# Patient Record
Sex: FEMALE | Race: WHITE | Employment: UNEMPLOYED | URBAN - NONMETROPOLITAN AREA
[De-identification: names, ages, dates, MRNs, and addresses within clinical notes are randomized per-mention and may not be internally consistent; named-entity substitution may affect disease eponyms.]

---

## 2017-12-18 ENCOUNTER — OFFICE VISIT (OUTPATIENT)
Dept: NEUROLOGY | Age: 27
End: 2017-12-18
Payer: MEDICAID

## 2017-12-18 VITALS
HEIGHT: 65 IN | SYSTOLIC BLOOD PRESSURE: 120 MMHG | HEART RATE: 89 BPM | BODY MASS INDEX: 38.15 KG/M2 | WEIGHT: 229 LBS | OXYGEN SATURATION: 97 % | DIASTOLIC BLOOD PRESSURE: 83 MMHG

## 2017-12-18 DIAGNOSIS — R06.83 SNORING: ICD-10-CM

## 2017-12-18 DIAGNOSIS — G47.33 OBSTRUCTIVE SLEEP APNEA: Primary | ICD-10-CM

## 2017-12-18 DIAGNOSIS — R40.0 SOMNOLENCE, DAYTIME: ICD-10-CM

## 2017-12-18 PROCEDURE — 99203 OFFICE O/P NEW LOW 30 MIN: CPT | Performed by: PHYSICIAN ASSISTANT

## 2017-12-18 RX ORDER — FLUOXETINE 10 MG/1
10 CAPSULE ORAL DAILY
COMMUNITY

## 2017-12-18 NOTE — PROGRESS NOTES
facility-administered medications for this visit. Allergies:  Review of patient's allergies indicates no known allergies. SOCIAL HISTORY:   History   Alcohol Use No     History   Drug Use No     History   Smoking Status    Current Every Day Smoker    Packs/day: 0.50   Smokeless Tobacco    Never Used       FAMILY HISTORY:   History reviewed. No pertinent family history.     REVIEW OF SYSTEMS     Constitutional: []Fever []Sweats []Chills [] Recent Injury   [x] Denies all unless marked  HENT:[]Headache  [] Head Injury  [] Sore Throat  [] Ear Pain  [] Dizziness [] Hearing Loss   [x] Denies all unless marked  Spine:  [] Neck pain  [] Back pain  [] Sciaticia  [x] Denies all unless marked  Cardiovascular:[]Chest Pain []Palpitations [] Heart Disease  [x] Denies all unless marked  Pulmonary: []Shortness of Breath []Cough   [x] Denies all unless marked  Gastrointestinal:  []Abdominal Pain  []Blood in Stool  []Diarrhea []Constipation []Nausea  []Vomiting  [x] Denies all unless marked  Genitourinary:  [] Dysuria [] Frequency  [] Incontinence [] Urgency   [x] Denies all unless marked  Musculoskeletal: [] Arthralgia  [] Myalgias [] Muscle cramps  [] Muscle twitches   [x] Denies all unless marked   Extremities:   [] Pain   [] Swelling   [x] Denies all unless marked  Skin:[] Rash  [] Color Change  [x] Denies all unless marked  Neurological:[] Visual Disturbance [] Double Vision [] Slurred Speech [] Trouble swallowing  [] Vertigo [] Tingling [] Numbness [] Weakness [] Loss of Balance   [] Loss of Consciousness [] Memory Loss  [x] Denies all unless marked  Psychiatric/Behavioral:[] Depression [] Anxiety  [x] Denies all unless marked  Sleep: []  Insomnia [x] Sleep Disturbance [x] Snoring [] Restless Legs [x] Daytime Sleepiness [] Sleep Apnea  [] Denies all unless marked    PHYSICAL EXAMINATION:  Vitals: /83   Pulse 89   Ht 5' 5\" (1.651 m)   Wt 229 lb (103.9 kg)   LMP 12/16/2017   SpO2 97%   BMI 38.11 kg/m²

## 2017-12-18 NOTE — PATIENT INSTRUCTIONS
apnea, see your doctor. Is there a test for sleep apnea?  Yes. If your doctor or nurse suspects you have sleep apnea, he or she might send you for a \"sleep study. \" Sleep studies can sometimes be done at home, but they are usually done in a sleep lab. For the study, you spend the night in the lab, and you are hooked up to different machines that monitor your heart rate, breathing, and other body functions. The results of the test will tell your doctor or nurse if you have the disorder. Is there anything I can do on my own to help my sleep apnea?  Yes. Here are some things that might help:  ? Stay off your back when sleeping. (This is not always practical, because people cannot control their position while asleep. Plus, it only helps some people.)  ? Lose weight, if you are overweight  ? Avoid alcohol, because it can make sleep apnea worse    How is sleep apnea treated?  As mentioned above, weight loss can help if you are overweight or obese. But losing weight can be challenging, and it takes time to lose enough weight to help with your sleep apnea. Most people need other treatment while they work on losing weight. The most effective treatment for sleep apnea is a device that keeps your airway open while you sleep. Treatment with this device is called \"continuous positive airway pressure,\" or CPAP. People getting CPAP wear a face mask at night that keeps them breathing. If your doctor or nurse recommends a CPAP machine, try to be patient about using it. The mask might seem uncomfortable to wear at first, and the machine might seem noisy, but using the machine can really pay off. People with sleep apnea who use a CPAP machine feel more rested and generally feel better. There is also another device that you wear in your mouth called an \"oral appliance\" or \"mandibular advancement device. \" It also helps keep your airway open while you sleep. This device is only recommended for mild cases of sleep apnea.  It may not be covered by your insurance. In rare cases, when nothing else helps, doctors recommend surgery to keep the airway open. Surgery to do this is not always effective, and even when it is, the problem can come back. Is sleep apnea dangerous?  It can be. People with sleep apnea do not get good-quality sleep, so they are often tired and not alert. This puts them at risk for car accidents and other types of accidents. Plus, studies show that people with sleep apnea are more likely than others to have high blood pressure, heart attacks, and other serious heart problems. In people with severe sleep apnea, getting treated (for example, with a CPAP machine) can help prevent some of these problems. Important information:  Medicare/private insurance CPAP/BiPAP/APAP requirements:  Medicare/private insurance has specific requirements for PAP compliance that must be met during the first 90 days of use to continue coverage for CPAP/BiPAP/APAP  from day 91 and beyond. The policy requires that patients use a PAP device 4 hours per 24 hour period, at least 70% of the time over a 30 day period. This data must be downloaded as a report direct from the PAP devices. This is called a compliance download. Your PAP supplier will assist you in this matter. Reminder:  Please bring a copy of the compliance download to your next office visit or have your supplier fax it to our office prior to your office visit. Note:  Where applicable, we will utilize PAP device efficiency reports, additional testing, and face-to-face  clinical evaluation subsequent to any treatment, changes in treatment, and continued treatment. Caution:  Please abstain from driving or engaging in other activities which may be hazardous in the presence of diminished alertness or daytime drowsiness. And avoid the use of sedatives or alcohol, which can worsen sleep apnea and daytime drowsiness.      Mask suggestions:  - Resmed Airfit N20 (Nasal) or F20 (Full face mask). They conform to your face, thus decreasing the potential for mask leakage. You might like the [employer] (full face mask). It has a \"memory foam\" like cushion. You might also like to try a nasal mask called a Dreamwear nasal mask or the Dreamwear nasal pillow. One other suggestion is the [facility], it is a minimal contact full face mask. The Ruth Kiss incredible under the nose design makes it the only full face mask that won't cause red marks on the bridge of your nose when compared to other Full Face Masks        Organizations  American Sleep Apnea Association  Provides information about sleep apnea to the public, publishes a newsletter, and serves as an advocate for people with the disorder. Anthony, [address]   Terry@Microtask. org   [email or web address].[email or web address]. org   Tel: [phone]   Fax: [facility] that works to [employer] and safety by promoting public understanding of sleep and sleep disorders. Supports sleep-related education, research, and advocacy; produces and distributes educational materials to the public and healthcare professionals; and offers postdoctoral fellowships and grants for sleep researchers. [address]   Ronel@Microtask. org   SurferLive.[email or web address]. org   Tel: [phone]   Fax: [phone]       Sleep Studies: About This Test  What is it? Sleep studies are tests that watch what happens to your body during sleep. These studies usually are done in a sleep lab. Sleep labs are often located in hospitals. But sleep studies also can be done with portable equipment that you use at home. Why is this test done? Sleep studies are done to find sleep problems, including:  · Sleep apnea or excessive snoring. · Narcolepsy. · Nocturnal seizures. · REM behavior disorder (RBD).   · Repeated muscle home. This may be a choice for people who have problems sleeping in a sleep lab. But home sleep studies may not give the same results as a sleep lab. How long does the test take? · You will stay in the sleep lab overnight. For some tests, you will also stay part of the next day. What happens after the test?  · You will be able to go home right away. · You can go back to your usual activities right away. Follow-up care is a key part of your treatment and safety. Be sure to make and go to all appointments, and call your doctor if you are having problems. It's also a good idea to keep a list of the medicines you take. Ask your doctor when you can expect to have your test results. Where can you learn more? Go to https://Eclector.SmartHub. org and sign in to your [facility] account. Enter S492 in the [place] box to learn more about \"Sleep Studies: About This Test.\"     If you do not have an account, please click on the \"Sign Up Now\" link. Current as of: May 12, 2017  Content Version: 11.4  © 6475-1034 [employer]. Care instructions adapted under license by [facility] ([facility]). If you have questions about a medical condition or this instruction, always ask your healthcare professional. [address] any warranty or liability for your use of this information. Learning About CPAP for Sleep Apnea  What is CPAP? CPAP is a small machine that you use at home every night while you sleep. It increases air pressure in your throat to keep your airway open. When you have sleep apnea, this can help you sleep better so you feel much better. CPAP stands for \"continuous positive airway pressure. \"  The CPAP machine will have one of the following:  · A mask that covers your nose and mouth  · Prongs that fit into your nose  · A mask that covers your nose only, the most common type. This type is called NCPAP. The N stands for \"nasal.\"  Why is it done?   CPAP is usually the Health. If you have questions about a medical condition or this instruction, always ask your healthcare professional. [place] [address] any warranty or liability for your use of this information.

## 2018-01-31 DIAGNOSIS — G47.33 OBSTRUCTIVE SLEEP APNEA: Primary | ICD-10-CM

## 2018-01-31 DIAGNOSIS — R06.83 SNORING: ICD-10-CM

## 2018-01-31 DIAGNOSIS — R40.0 DAYTIME SOMNOLENCE: ICD-10-CM

## 2018-02-14 ENCOUNTER — HOSPITAL ENCOUNTER (OUTPATIENT)
Dept: SLEEP CENTER | Age: 28
Discharge: HOME | End: 2018-02-16
Payer: MEDICAID

## 2018-02-14 PROCEDURE — G0399 HOME SLEEP TEST/TYPE 3 PORTA: HCPCS

## 2018-02-14 PROCEDURE — 95806 SLEEP STUDY UNATT&RESP EFFT: CPT | Performed by: PSYCHIATRY & NEUROLOGY

## 2018-02-26 DIAGNOSIS — G47.33 OBSTRUCTIVE SLEEP APNEA: Primary | ICD-10-CM

## 2018-05-02 ENCOUNTER — TELEPHONE (OUTPATIENT)
Dept: NEUROLOGY | Age: 28
End: 2018-05-02

## 2019-11-04 LAB
EXTERNAL ANTIBODY SCREEN: NEGATIVE
EXTERNAL HEPATITIS B SURFACE ANTIGEN: NEGATIVE
EXTERNAL HEPATITIS C AB: NEGATIVE
EXTERNAL HERPES CULTURE: NORMAL
EXTERNAL RUBELLA QUALITATIVE: NORMAL
EXTERNAL SYPHILIS RPR SCREEN: NORMAL
HIV1 P24 AG SERPL QL IA: NORMAL

## 2020-04-28 ENCOUNTER — HOSPITAL ENCOUNTER (OUTPATIENT)
Facility: HOSPITAL | Age: 30
Discharge: HOME | End: 2020-04-28
Attending: OBSTETRICS & GYNECOLOGY | Admitting: OBSTETRICS & GYNECOLOGY

## 2020-04-28 VITALS
HEART RATE: 90 BPM | SYSTOLIC BLOOD PRESSURE: 118 MMHG | DIASTOLIC BLOOD PRESSURE: 56 MMHG | RESPIRATION RATE: 18 BRPM | TEMPERATURE: 98.2 F | OXYGEN SATURATION: 96 %

## 2020-04-28 PROBLEM — Z34.90 PREGNANCY: Status: ACTIVE | Noted: 2020-04-28

## 2020-04-28 PROCEDURE — 59025 FETAL NON-STRESS TEST: CPT

## 2020-04-28 PROCEDURE — G0463 HOSPITAL OUTPT CLINIC VISIT: HCPCS

## 2020-04-28 PROCEDURE — G0378 HOSPITAL OBSERVATION PER HR: HCPCS

## 2020-05-07 ENCOUNTER — NURSE TRIAGE (OUTPATIENT)
Dept: CALL CENTER | Facility: HOSPITAL | Age: 30
End: 2020-05-07

## 2020-05-08 NOTE — TELEPHONE ENCOUNTER
"Reviewed guideline with caller, transferred call to LDR for advice.       Reason for Disposition  • [1] MILD abdominal pain (e.g., doesn't interfere with normal activities) AND [2] constant AND [3] present > 2 hours    Additional Information  • Negative: Passed out (i.e., lost consciousness, collapsed and was not responding)  • Negative: Shock suspected (e.g., cold/pale/clammy skin, too weak to stand, low BP, rapid pulse)  • Negative: Difficult to awaken or acting confused (e.g., disoriented, slurred speech)  • Negative: [1] SEVERE abdominal pain (e.g., excruciating) AND [2] constant AND [3] present > 1 hour  • Negative: SEVERE vaginal bleeding (e.g., continuous red blood from vagina, or large blood clots)  • Negative: Sounds like a life-threatening emergency to the triager  • Negative: Followed an abdomen (stomach) injury  • Negative: [1] Having contractions or other symptoms of labor (such as vaginal pressure) AND [2] >= 37 weeks pregnant (i.e., term pregnancy)  • Negative: [1] Having contractions or other symptoms of labor (such as vaginal pressure) AND [2] < 37 weeks pregnant (i.e., )  • Negative: [1] Abdominal pain AND [2] pregnant < 20 weeks  • Negative: [1] Vomiting AND [2] contains red blood or black (\"coffee ground\") material  (Exception: few red streaks in vomit that only happened once)  • Negative: MODERATE-SEVERE abdominal pain (e.g., interferes with normal activities, awakens from sleep)  • Negative: Vaginal bleeding or spotting  • Negative: [1] Baby moving less today (e.g., kick count < 5 in 1 hour or < 10 in 2 hours) AND [2] pregnant 23 or more weeks  • Negative: Leakage of fluid from vagina  • Negative: New hand or face swelling  • Negative: New blurred vision or vision changes  • Negative: [1] SEVERE headache AND [2] not relieved with acetaminophen (e.g., Tylenol)    Answer Assessment - Initial Assessment Questions  1. LOCATION: \"Where does it hurt?\"       Lower abdomen and back, tightening " "feeling in abdomen  2. RADIATION: \"Does the pain shoot anywhere else?\" (e.g., chest, back)      back  3. ONSET: \"When did the pain begin?\" (Minutes, hours or days ago)       2 hours ago  4. ONSET: \"Gradual or sudden onset?\"      Sudden onset  5. PATTERN: \"Does the pain come and go, or has it been constant since it started?\"       Comes and goes   6. SEVERITY: \"How bad is the pain?\" \"What does it keep you from doing?\"  (e.g., Scale 1-10; mild, moderate, or severe)    - MILD (1-3): doesn't interfere with normal activities, abdomen soft and not tender to touch     - MODERATE (4-7): interferes with normal activities or awakens from sleep, tender to touch     - SEVERE (8-10): excruciating pain, doubled over, unable to do any normal activities      8/10  7. RECURRENT SYMPTOM: \"Have you ever had this type of abdominal pain before?\" If so, ask: \"When was the last time?\" and \"What happened that time?\"       no  8. CAUSE: \"What do you think is causing the abdominal pain?      Possible labor pains   9. RELIEVING/AGGRAVATING FACTORS: \"What makes it better or worse?\" (e.g., antacids, bowel movement, movement)      no  10. OTHER SYMPTOMS: \"Has there been any vaginal bleeding, fever, vomiting, diarrhea, or urine problems?\"        no  11. JHONNY: \"What date are you expecting to deliver?\"        06/16/20    Protocols used: PREGNANCY - ABDOMINAL PAIN GREATER THAN 20 WEEKS EGA-ADULT-AH      "

## 2020-05-12 LAB — EXTERNAL GROUP B STREP ANTIGEN: NORMAL

## 2020-06-03 ENCOUNTER — TRANSCRIBE ORDERS (OUTPATIENT)
Dept: ADMINISTRATIVE | Facility: HOSPITAL | Age: 30
End: 2020-06-03

## 2020-06-03 DIAGNOSIS — Z01.818 PREOP TESTING: Primary | ICD-10-CM

## 2020-06-08 ENCOUNTER — LAB (OUTPATIENT)
Dept: LAB | Facility: HOSPITAL | Age: 30
End: 2020-06-08

## 2020-06-08 DIAGNOSIS — Z01.818 PRE-OP TESTING: Primary | ICD-10-CM

## 2020-06-08 PROCEDURE — U0003 INFECTIOUS AGENT DETECTION BY NUCLEIC ACID (DNA OR RNA); SEVERE ACUTE RESPIRATORY SYNDROME CORONAVIRUS 2 (SARS-COV-2) (CORONAVIRUS DISEASE [COVID-19]), AMPLIFIED PROBE TECHNIQUE, MAKING USE OF HIGH THROUGHPUT TECHNOLOGIES AS DESCRIBED BY CMS-2020-01-R: HCPCS

## 2020-06-08 PROCEDURE — C9803 HOPD COVID-19 SPEC COLLECT: HCPCS

## 2020-06-09 ENCOUNTER — PREP FOR SURGERY (OUTPATIENT)
Dept: OTHER | Facility: HOSPITAL | Age: 30
End: 2020-06-09

## 2020-06-09 LAB
COVID LABCORP PRIORITY: NORMAL
SARS-COV-2 RNA RESP QL NAA+PROBE: NOT DETECTED

## 2020-06-09 RX ORDER — SODIUM CHLORIDE 0.9 % (FLUSH) 0.9 %
3 SYRINGE (ML) INJECTION EVERY 12 HOURS SCHEDULED
Status: CANCELLED | OUTPATIENT
Start: 2020-06-10

## 2020-06-09 RX ORDER — CEFAZOLIN SODIUM IN 0.9 % NACL 3 G/100 ML
3 INTRAVENOUS SOLUTION, PIGGYBACK (ML) INTRAVENOUS ONCE
Status: CANCELLED | OUTPATIENT
Start: 2020-06-09 | End: 2020-06-09

## 2020-06-09 RX ORDER — SODIUM CHLORIDE, SODIUM LACTATE, POTASSIUM CHLORIDE, CALCIUM CHLORIDE 600; 310; 30; 20 MG/100ML; MG/100ML; MG/100ML; MG/100ML
125 INJECTION, SOLUTION INTRAVENOUS CONTINUOUS
Status: CANCELLED | OUTPATIENT
Start: 2020-06-10

## 2020-06-09 RX ORDER — SODIUM CHLORIDE 0.9 % (FLUSH) 0.9 %
3-10 SYRINGE (ML) INJECTION AS NEEDED
Status: CANCELLED | OUTPATIENT
Start: 2020-06-10

## 2020-06-10 ENCOUNTER — ANESTHESIA (OUTPATIENT)
Dept: LABOR AND DELIVERY | Facility: HOSPITAL | Age: 30
End: 2020-06-10

## 2020-06-10 ENCOUNTER — ANESTHESIA EVENT (OUTPATIENT)
Dept: LABOR AND DELIVERY | Facility: HOSPITAL | Age: 30
End: 2020-06-10

## 2020-06-10 ENCOUNTER — HOSPITAL ENCOUNTER (INPATIENT)
Facility: HOSPITAL | Age: 30
LOS: 2 days | Discharge: HOME | End: 2020-06-12
Attending: OBSTETRICS & GYNECOLOGY | Admitting: OBSTETRICS & GYNECOLOGY

## 2020-06-10 PROBLEM — Z34.90 PREGNANCY: Status: RESOLVED | Noted: 2020-04-28 | Resolved: 2020-06-10

## 2020-06-10 LAB
ABO GROUP BLD: NORMAL
BLD GP AB SCN SERPL QL: NEGATIVE
DEPRECATED RDW RBC AUTO: 46.4 FL (ref 37–54)
ERYTHROCYTE [DISTWIDTH] IN BLOOD BY AUTOMATED COUNT: 12.8 % (ref 12.3–15.4)
HCT VFR BLD AUTO: 34.1 % (ref 34–46.6)
HGB BLD-MCNC: 11.8 G/DL (ref 12–15.9)
LYMPHOCYTES # BLD MANUAL: 1.61 10*3/MM3 (ref 0.7–3.1)
LYMPHOCYTES NFR BLD MANUAL: 18 % (ref 19.6–45.3)
LYMPHOCYTES NFR BLD MANUAL: 6 % (ref 5–12)
MCH RBC QN AUTO: 34.5 PG (ref 26.6–33)
MCHC RBC AUTO-ENTMCNC: 34.6 G/DL (ref 31.5–35.7)
MCV RBC AUTO: 99.7 FL (ref 79–97)
MONOCYTES # BLD AUTO: 0.54 10*3/MM3 (ref 0.1–0.9)
NEUTROPHILS # BLD AUTO: 6.71 10*3/MM3 (ref 1.7–7)
NEUTROPHILS NFR BLD MANUAL: 74 % (ref 42.7–76)
NEUTS BAND NFR BLD MANUAL: 1 % (ref 0–5)
PLATELET # BLD AUTO: 269 10*3/MM3 (ref 140–450)
PMV BLD AUTO: 10.2 FL (ref 6–12)
RBC # BLD AUTO: 3.42 10*6/MM3 (ref 3.77–5.28)
RBC MORPH BLD: NORMAL
RH BLD: POSITIVE
SMALL PLATELETS BLD QL SMEAR: ADEQUATE
T&S EXPIRATION DATE: NORMAL
VARIANT LYMPHS NFR BLD MANUAL: 1 % (ref 0–5)
WBC MORPH BLD: NORMAL
WBC NRBC COR # BLD: 8.95 10*3/MM3 (ref 3.4–10.8)

## 2020-06-10 PROCEDURE — 25010000002 ONDANSETRON PER 1 MG: Performed by: OBSTETRICS & GYNECOLOGY

## 2020-06-10 PROCEDURE — 25010000002 DEXAMETHASONE PER 1 MG: Performed by: NURSE ANESTHETIST, CERTIFIED REGISTERED

## 2020-06-10 PROCEDURE — 25010000002 KETOROLAC TROMETHAMINE PER 15 MG: Performed by: NURSE ANESTHETIST, CERTIFIED REGISTERED

## 2020-06-10 PROCEDURE — 94799 UNLISTED PULMONARY SVC/PX: CPT

## 2020-06-10 PROCEDURE — 86850 RBC ANTIBODY SCREEN: CPT | Performed by: OBSTETRICS & GYNECOLOGY

## 2020-06-10 PROCEDURE — 25010000002 KETOROLAC TROMETHAMINE PER 15 MG: Performed by: OBSTETRICS & GYNECOLOGY

## 2020-06-10 PROCEDURE — 86901 BLOOD TYPING SEROLOGIC RH(D): CPT | Performed by: OBSTETRICS & GYNECOLOGY

## 2020-06-10 PROCEDURE — 25010000002 HYDROMORPHONE 1 MG/ML SOLUTION: Performed by: NURSE ANESTHETIST, CERTIFIED REGISTERED

## 2020-06-10 PROCEDURE — C1765 ADHESION BARRIER: HCPCS | Performed by: OBSTETRICS & GYNECOLOGY

## 2020-06-10 PROCEDURE — 25010000002 NALBUPHINE PER 10 MG: Performed by: NURSE ANESTHETIST, CERTIFIED REGISTERED

## 2020-06-10 PROCEDURE — 25010000002 CEFEPIME PER 500 MG: Performed by: OBSTETRICS & GYNECOLOGY

## 2020-06-10 PROCEDURE — 85027 COMPLETE CBC AUTOMATED: CPT | Performed by: OBSTETRICS & GYNECOLOGY

## 2020-06-10 PROCEDURE — 88307 TISSUE EXAM BY PATHOLOGIST: CPT | Performed by: OBSTETRICS & GYNECOLOGY

## 2020-06-10 PROCEDURE — 86900 BLOOD TYPING SEROLOGIC ABO: CPT | Performed by: OBSTETRICS & GYNECOLOGY

## 2020-06-10 PROCEDURE — 25010000002 ONDANSETRON PER 1 MG: Performed by: NURSE ANESTHETIST, CERTIFIED REGISTERED

## 2020-06-10 PROCEDURE — 51702 INSERT TEMP BLADDER CATH: CPT

## 2020-06-10 DEVICE — DEV CONTRL TISS STRATAFIX SPIRAL PGPCL 2/0FS 30X30CM: Type: IMPLANTABLE DEVICE | Site: ABDOMEN | Status: FUNCTIONAL

## 2020-06-10 RX ORDER — SODIUM CHLORIDE 0.9 % (FLUSH) 0.9 %
3 SYRINGE (ML) INJECTION EVERY 12 HOURS SCHEDULED
Status: DISCONTINUED | OUTPATIENT
Start: 2020-06-10 | End: 2020-06-10 | Stop reason: HOSPADM

## 2020-06-10 RX ORDER — DOCUSATE SODIUM 100 MG/1
100 CAPSULE, LIQUID FILLED ORAL 2 TIMES DAILY PRN
Status: DISCONTINUED | OUTPATIENT
Start: 2020-06-10 | End: 2020-06-12 | Stop reason: HOSPADM

## 2020-06-10 RX ORDER — ONDANSETRON 4 MG/1
4 TABLET, FILM COATED ORAL EVERY 6 HOURS PRN
Status: DISCONTINUED | OUTPATIENT
Start: 2020-06-10 | End: 2020-06-12 | Stop reason: HOSPADM

## 2020-06-10 RX ORDER — ONDANSETRON 2 MG/ML
INJECTION INTRAMUSCULAR; INTRAVENOUS AS NEEDED
Status: DISCONTINUED | OUTPATIENT
Start: 2020-06-10 | End: 2020-06-10 | Stop reason: SURG

## 2020-06-10 RX ORDER — PHENYLEPHRINE HCL IN 0.9% NACL 0.8MG/10ML
SYRINGE (ML) INTRAVENOUS AS NEEDED
Status: DISCONTINUED | OUTPATIENT
Start: 2020-06-10 | End: 2020-06-10 | Stop reason: SURG

## 2020-06-10 RX ORDER — DEXAMETHASONE SODIUM PHOSPHATE 4 MG/ML
INJECTION, SOLUTION INTRA-ARTICULAR; INTRALESIONAL; INTRAMUSCULAR; INTRAVENOUS; SOFT TISSUE AS NEEDED
Status: DISCONTINUED | OUTPATIENT
Start: 2020-06-10 | End: 2020-06-10 | Stop reason: SURG

## 2020-06-10 RX ORDER — OXYCODONE AND ACETAMINOPHEN 7.5; 325 MG/1; MG/1
1 TABLET ORAL EVERY 4 HOURS PRN
Status: ACTIVE | OUTPATIENT
Start: 2020-06-10 | End: 2020-06-11

## 2020-06-10 RX ORDER — ONDANSETRON 2 MG/ML
4 INJECTION INTRAMUSCULAR; INTRAVENOUS EVERY 6 HOURS PRN
Status: DISCONTINUED | OUTPATIENT
Start: 2020-06-10 | End: 2020-06-10 | Stop reason: SDUPTHER

## 2020-06-10 RX ORDER — METHYLERGONOVINE MALEATE 0.2 MG/ML
200 INJECTION INTRAVENOUS
Status: ACTIVE | OUTPATIENT
Start: 2020-06-10 | End: 2020-06-11

## 2020-06-10 RX ORDER — NALOXONE HCL 0.4 MG/ML
0.4 VIAL (ML) INJECTION
Status: ACTIVE | OUTPATIENT
Start: 2020-06-10 | End: 2020-06-11

## 2020-06-10 RX ORDER — IBUPROFEN 800 MG/1
800 TABLET ORAL EVERY 8 HOURS PRN
Status: DISCONTINUED | OUTPATIENT
Start: 2020-06-11 | End: 2020-06-12 | Stop reason: HOSPADM

## 2020-06-10 RX ORDER — BUPIVACAINE HYDROCHLORIDE 7.5 MG/ML
INJECTION, SOLUTION EPIDURAL; RETROBULBAR AS NEEDED
Status: DISCONTINUED | OUTPATIENT
Start: 2020-06-10 | End: 2020-06-10 | Stop reason: SURG

## 2020-06-10 RX ORDER — BISACODYL 5 MG/1
10 TABLET, DELAYED RELEASE ORAL DAILY PRN
Status: DISCONTINUED | OUTPATIENT
Start: 2020-06-10 | End: 2020-06-12 | Stop reason: HOSPADM

## 2020-06-10 RX ORDER — SODIUM CHLORIDE 0.9 % (FLUSH) 0.9 %
3-10 SYRINGE (ML) INJECTION AS NEEDED
Status: DISCONTINUED | OUTPATIENT
Start: 2020-06-10 | End: 2020-06-12 | Stop reason: HOSPADM

## 2020-06-10 RX ORDER — SODIUM CHLORIDE 0.9 % (FLUSH) 0.9 %
3 SYRINGE (ML) INJECTION EVERY 12 HOURS SCHEDULED
Status: DISCONTINUED | OUTPATIENT
Start: 2020-06-10 | End: 2020-06-12 | Stop reason: HOSPADM

## 2020-06-10 RX ORDER — FAMOTIDINE 10 MG/ML
20 INJECTION, SOLUTION INTRAVENOUS ONCE AS NEEDED
Status: COMPLETED | OUTPATIENT
Start: 2020-06-10 | End: 2020-06-10

## 2020-06-10 RX ORDER — CALCIUM CARBONATE 200(500)MG
1 TABLET,CHEWABLE ORAL 2 TIMES DAILY PRN
Status: DISCONTINUED | OUTPATIENT
Start: 2020-06-10 | End: 2020-06-12 | Stop reason: HOSPADM

## 2020-06-10 RX ORDER — CEFAZOLIN SODIUM IN 0.9 % NACL 3 G/100 ML
3 INTRAVENOUS SOLUTION, PIGGYBACK (ML) INTRAVENOUS ONCE
Status: COMPLETED | OUTPATIENT
Start: 2020-06-10 | End: 2020-06-10

## 2020-06-10 RX ORDER — SODIUM CHLORIDE, SODIUM LACTATE, POTASSIUM CHLORIDE, CALCIUM CHLORIDE 600; 310; 30; 20 MG/100ML; MG/100ML; MG/100ML; MG/100ML
125 INJECTION, SOLUTION INTRAVENOUS CONTINUOUS
Status: DISCONTINUED | OUTPATIENT
Start: 2020-06-10 | End: 2020-06-12 | Stop reason: HOSPADM

## 2020-06-10 RX ORDER — OXYCODONE AND ACETAMINOPHEN 10; 325 MG/1; MG/1
1 TABLET ORAL EVERY 6 HOURS PRN
Status: DISCONTINUED | OUTPATIENT
Start: 2020-06-11 | End: 2020-06-12 | Stop reason: HOSPADM

## 2020-06-10 RX ORDER — SODIUM CHLORIDE 0.9 % (FLUSH) 0.9 %
3-10 SYRINGE (ML) INJECTION AS NEEDED
Status: DISCONTINUED | OUTPATIENT
Start: 2020-06-10 | End: 2020-06-10 | Stop reason: HOSPADM

## 2020-06-10 RX ORDER — MISOPROSTOL 200 UG/1
800 TABLET ORAL ONCE AS NEEDED
Status: ACTIVE | OUTPATIENT
Start: 2020-06-10 | End: 2020-06-11

## 2020-06-10 RX ORDER — PROMETHAZINE HYDROCHLORIDE 25 MG/ML
12.5 INJECTION, SOLUTION INTRAMUSCULAR; INTRAVENOUS EVERY 6 HOURS PRN
Status: DISCONTINUED | OUTPATIENT
Start: 2020-06-10 | End: 2020-06-12 | Stop reason: HOSPADM

## 2020-06-10 RX ORDER — ONDANSETRON 2 MG/ML
4 INJECTION INTRAMUSCULAR; INTRAVENOUS EVERY 6 HOURS PRN
Status: DISCONTINUED | OUTPATIENT
Start: 2020-06-10 | End: 2020-06-12 | Stop reason: HOSPADM

## 2020-06-10 RX ORDER — OXYTOCIN/0.9 % SODIUM CHLORIDE 30/500 ML
250 PLASTIC BAG, INJECTION (ML) INTRAVENOUS CONTINUOUS
Status: DISPENSED | OUTPATIENT
Start: 2020-06-10 | End: 2020-06-10

## 2020-06-10 RX ORDER — CETIRIZINE HYDROCHLORIDE 10 MG/1
10 TABLET ORAL DAILY
COMMUNITY
End: 2020-06-12 | Stop reason: HOSPADM

## 2020-06-10 RX ORDER — ONDANSETRON 4 MG/1
4 TABLET, FILM COATED ORAL EVERY 8 HOURS PRN
COMMUNITY

## 2020-06-10 RX ORDER — TRISODIUM CITRATE DIHYDRATE AND CITRIC ACID MONOHYDRATE 500; 334 MG/5ML; MG/5ML
30 SOLUTION ORAL ONCE
Status: COMPLETED | OUTPATIENT
Start: 2020-06-10 | End: 2020-06-10

## 2020-06-10 RX ORDER — KETOROLAC TROMETHAMINE 30 MG/ML
30 INJECTION, SOLUTION INTRAMUSCULAR; INTRAVENOUS EVERY 6 HOURS
Status: COMPLETED | OUTPATIENT
Start: 2020-06-10 | End: 2020-06-11

## 2020-06-10 RX ORDER — OXYCODONE HYDROCHLORIDE AND ACETAMINOPHEN 5; 325 MG/1; MG/1
1 TABLET ORAL EVERY 4 HOURS PRN
Status: DISCONTINUED | OUTPATIENT
Start: 2020-06-11 | End: 2020-06-12 | Stop reason: HOSPADM

## 2020-06-10 RX ORDER — OXYTOCIN/0.9 % SODIUM CHLORIDE 30/500 ML
125 PLASTIC BAG, INJECTION (ML) INTRAVENOUS CONTINUOUS PRN
Status: COMPLETED | OUTPATIENT
Start: 2020-06-10 | End: 2020-06-10

## 2020-06-10 RX ORDER — OXYCODONE AND ACETAMINOPHEN 7.5; 325 MG/1; MG/1
2 TABLET ORAL EVERY 4 HOURS PRN
Status: ACTIVE | OUTPATIENT
Start: 2020-06-10 | End: 2020-06-11

## 2020-06-10 RX ORDER — KETOROLAC TROMETHAMINE 30 MG/ML
INJECTION, SOLUTION INTRAMUSCULAR; INTRAVENOUS AS NEEDED
Status: DISCONTINUED | OUTPATIENT
Start: 2020-06-10 | End: 2020-06-10 | Stop reason: SURG

## 2020-06-10 RX ORDER — SODIUM CHLORIDE, SODIUM LACTATE, POTASSIUM CHLORIDE, CALCIUM CHLORIDE 600; 310; 30; 20 MG/100ML; MG/100ML; MG/100ML; MG/100ML
125 INJECTION, SOLUTION INTRAVENOUS CONTINUOUS
Status: DISCONTINUED | OUTPATIENT
Start: 2020-06-10 | End: 2020-06-10 | Stop reason: HOSPADM

## 2020-06-10 RX ORDER — VALACYCLOVIR HYDROCHLORIDE 500 MG/1
500 TABLET, FILM COATED ORAL DAILY
Status: ON HOLD | COMMUNITY
End: 2020-06-10

## 2020-06-10 RX ORDER — CARBOPROST TROMETHAMINE 250 UG/ML
250 INJECTION, SOLUTION INTRAMUSCULAR
Status: ACTIVE | OUTPATIENT
Start: 2020-06-10 | End: 2020-06-11

## 2020-06-10 RX ORDER — PROMETHAZINE HYDROCHLORIDE 25 MG/1
25 TABLET ORAL EVERY 6 HOURS PRN
Status: DISCONTINUED | OUTPATIENT
Start: 2020-06-10 | End: 2020-06-12 | Stop reason: HOSPADM

## 2020-06-10 RX ORDER — OXYTOCIN/0.9 % SODIUM CHLORIDE 30/500 ML
999 PLASTIC BAG, INJECTION (ML) INTRAVENOUS ONCE
Status: DISCONTINUED | OUTPATIENT
Start: 2020-06-10 | End: 2020-06-12 | Stop reason: HOSPADM

## 2020-06-10 RX ORDER — BUTORPHANOL TARTRATE 1 MG/ML
0.5 INJECTION, SOLUTION INTRAMUSCULAR; INTRAVENOUS EVERY 6 HOURS PRN
Status: ACTIVE | OUTPATIENT
Start: 2020-06-10 | End: 2020-06-11

## 2020-06-10 RX ORDER — NALBUPHINE HCL 10 MG/ML
2.5 AMPUL (ML) INJECTION EVERY 4 HOURS PRN
Status: DISPENSED | OUTPATIENT
Start: 2020-06-10 | End: 2020-06-11

## 2020-06-10 RX ORDER — PRENATAL VIT/IRON FUM/FOLIC AC 27MG-0.8MG
1 TABLET ORAL DAILY
Status: DISCONTINUED | OUTPATIENT
Start: 2020-06-11 | End: 2020-06-12 | Stop reason: HOSPADM

## 2020-06-10 RX ORDER — PROMETHAZINE HYDROCHLORIDE 12.5 MG/1
12.5 SUPPOSITORY RECTAL EVERY 6 HOURS PRN
Status: DISCONTINUED | OUTPATIENT
Start: 2020-06-10 | End: 2020-06-12 | Stop reason: HOSPADM

## 2020-06-10 RX ADMIN — SODIUM CITRATE AND CITRIC ACID MONOHYDRATE 30 ML: 500; 334 SOLUTION ORAL at 09:42

## 2020-06-10 RX ADMIN — BUPIVACAINE HYDROCHLORIDE 1.7 ML: 7.5 INJECTION, SOLUTION EPIDURAL; RETROBULBAR at 11:35

## 2020-06-10 RX ADMIN — SODIUM CHLORIDE, POTASSIUM CHLORIDE, SODIUM LACTATE AND CALCIUM CHLORIDE 125 ML/HR: 600; 310; 30; 20 INJECTION, SOLUTION INTRAVENOUS at 20:36

## 2020-06-10 RX ADMIN — SODIUM CHLORIDE, POTASSIUM CHLORIDE, SODIUM LACTATE AND CALCIUM CHLORIDE: 600; 310; 30; 20 INJECTION, SOLUTION INTRAVENOUS at 12:14

## 2020-06-10 RX ADMIN — CEFEPIME HYDROCHLORIDE 2 G: 2 INJECTION, POWDER, FOR SOLUTION INTRAVENOUS at 16:20

## 2020-06-10 RX ADMIN — ONDANSETRON HYDROCHLORIDE 4 MG: 2 SOLUTION INTRAMUSCULAR; INTRAVENOUS at 23:05

## 2020-06-10 RX ADMIN — SODIUM CHLORIDE, POTASSIUM CHLORIDE, SODIUM LACTATE AND CALCIUM CHLORIDE 125 ML/HR: 600; 310; 30; 20 INJECTION, SOLUTION INTRAVENOUS at 09:53

## 2020-06-10 RX ADMIN — HYDROMORPHONE HYDROCHLORIDE 100 MCG: 1 INJECTION, SOLUTION INTRAMUSCULAR; INTRAVENOUS; SUBCUTANEOUS at 11:35

## 2020-06-10 RX ADMIN — SODIUM CHLORIDE, POTASSIUM CHLORIDE, SODIUM LACTATE AND CALCIUM CHLORIDE 1000 ML: 600; 310; 30; 20 INJECTION, SOLUTION INTRAVENOUS at 07:45

## 2020-06-10 RX ADMIN — NALBUPHINE HYDROCHLORIDE 2.5 MG: 10 INJECTION, SOLUTION INTRAMUSCULAR; INTRAVENOUS; SUBCUTANEOUS at 13:51

## 2020-06-10 RX ADMIN — KETOROLAC TROMETHAMINE 30 MG: 30 INJECTION, SOLUTION INTRAMUSCULAR; INTRAVENOUS at 19:00

## 2020-06-10 RX ADMIN — DEXAMETHASONE SODIUM PHOSPHATE 8 MG: 4 INJECTION, SOLUTION INTRAMUSCULAR; INTRAVENOUS at 12:01

## 2020-06-10 RX ADMIN — ONDANSETRON HYDROCHLORIDE 4 MG: 2 SOLUTION INTRAMUSCULAR; INTRAVENOUS at 11:45

## 2020-06-10 RX ADMIN — OXYTOCIN 0.7 UNITS/MIN: 10 INJECTION, SOLUTION INTRAMUSCULAR; INTRAVENOUS at 11:58

## 2020-06-10 RX ADMIN — METRONIDAZOLE 500 MG: 500 INJECTION, SOLUTION INTRAVENOUS at 13:00

## 2020-06-10 RX ADMIN — OXYTOCIN-SODIUM CHLORIDE 0.9% IV SOLN 30 UNIT/500ML 125 ML/HR: 30-0.9/5 SOLUTION at 15:00

## 2020-06-10 RX ADMIN — FAMOTIDINE 20 MG: 10 INJECTION, SOLUTION INTRAVENOUS at 09:42

## 2020-06-10 RX ADMIN — Medication 80 MCG: at 12:01

## 2020-06-10 RX ADMIN — CEFAZOLIN 3 G: 1 INJECTION, POWDER, FOR SOLUTION INTRAMUSCULAR; INTRAVENOUS; PARENTERAL at 11:37

## 2020-06-10 RX ADMIN — METRONIDAZOLE 500 MG: 500 INJECTION, SOLUTION INTRAVENOUS at 19:49

## 2020-06-10 RX ADMIN — HYDROMORPHONE HYDROCHLORIDE 900 MCG: 1 INJECTION, SOLUTION INTRAMUSCULAR; INTRAVENOUS; SUBCUTANEOUS at 12:01

## 2020-06-10 RX ADMIN — KETOROLAC TROMETHAMINE 30 MG: 30 INJECTION, SOLUTION INTRAMUSCULAR at 12:23

## 2020-06-10 NOTE — PLAN OF CARE
Problem: Patient Care Overview  Goal: Plan of Care Review  Outcome: Ongoing (interventions implemented as appropriate)  Flowsheets (Taken 6/10/2020 4898)  Progress: improving  Plan of Care Reviewed With: patient; spouse  Outcome Summary: Viable female delivered via csection. Low transverse incision with prieno dressing in place. FF.u2,lochia scant. Pt denies pain at this time, received 2.5 mg of nubain for itching. Pitocin 2nd bag infusing.

## 2020-06-10 NOTE — PLAN OF CARE
Problem: Patient Care Overview  Goal: Plan of Care Review  Outcome: Ongoing (interventions implemented as appropriate)  Flowsheets (Taken 6/10/2020 1700)  Progress: improving  Plan of Care Reviewed With: patient; spouse  Outcome Summary: VSS: FF/ML/U2 with scant lochia, incision clean dry and intact with no symptoms of infection, output borderline adequate, nothing for pain given yet, does not want the TDAP vaccine, encouraged cough and deep breathing and incentive hourly while awake, O2 runs around 95%, antiboitcs and toradol as scheulded.     Problem: Patient Care Overview  Goal: Individualization and Mutuality  Outcome: Ongoing (interventions implemented as appropriate)  Flowsheets  Taken 6/10/2020 1700 by Mg Mckenzie, RN  Patient Specific Goals (Include Timeframe): pain at tolerable level and home with healthy infant  Patient Specific Interventions: give pain medication and monitor effectivness, assist with going to see infant in NICU as needed  What Anxieties, Fears, Concerns, or Questions Do You Have About Your Care?: current status of infant in the NICU  Patient Specific Preferences: formula feeding; wants to go to NICU to see infant  Taken 6/10/2020 1426 by Christina Jon, RN  How to Address Anxieties/Fears: Educate mom on schedules and visiting times in NICU for infant involement

## 2020-06-10 NOTE — ANESTHESIA PROCEDURE NOTES
Spinal Block      Patient reassessed immediately prior to procedure    Patient location during procedure: OB  Indication:procedure for pain  Performed By  CRNA: Lori Godoy CRNA  Preanesthetic Checklist  Completed: patient identified, site marked, surgical consent, pre-op evaluation, timeout performed, IV checked, risks and benefits discussed and monitors and equipment checked  Spinal Block Prep:  Patient Position:sitting  Sterile Tech:cap, gloves and sterile barriers  Patient Monitoring:EKG, continuous pulse oximetry and blood pressure monitoring  Spinal Block Procedure  Approach:midline  Guidance:landmark technique  Location:L4-L5  Needle Type:Liz  Needle Gauge:24 G  Placement of Spinal needle event:cerebrospinal fluid aspirated  Paresthesia: left and transient  Fluid Appearance:clear     Post Assessment  Patient Tolerance:patient tolerated the procedure well with no apparent complications  Complications no

## 2020-06-10 NOTE — ANESTHESIA PREPROCEDURE EVALUATION
Anesthesia Evaluation     NPO Solid Status: > 8 hours  NPO Liquid Status: > 8 hours           Airway   Mallampati: II  TM distance: >3 FB  Neck ROM: full  No difficulty expected  Dental      Pulmonary - negative pulmonary ROS   Cardiovascular - negative cardio ROS        Neuro/Psych- negative ROS  GI/Hepatic/Renal/Endo    (+) obesity,       Musculoskeletal     Abdominal    Substance History - negative use     OB/GYN    (+) Pregnant,         Other - negative ROS                Lab Results   Component Value Date    WBC 8.95 06/10/2020    HGB 11.8 (L) 06/10/2020    HCT 34.1 06/10/2020    MCV 99.7 (H) 06/10/2020     06/10/2020     Lab Results   Component Value Date    GLUCOSE 86 05/19/2016    CALCIUM 9.2 05/19/2016     05/19/2016    K 3.9 05/19/2016    CO2 24 05/19/2016     05/19/2016    BUN 9 05/19/2016    CREATININE 0.61 05/19/2016    ANIONGAP 10 05/19/2016              Anesthesia Plan    ASA 2     spinal       Anesthetic plan, all risks, benefits, and alternatives have been provided, discussed and informed consent has been obtained with: patient.    Plan discussed with CRNA.

## 2020-06-10 NOTE — LACTATION NOTE
Mother desires to formula feed. Non-nursing mother's packet given and reviewed with patient and SO. Discussed signs of milk, signs/symptoms/treatment of engorgement and mastitis. Encouragement and support provided.

## 2020-06-10 NOTE — OP NOTE
Section Procedure Note    Indications: malpresentation: nettie breech    Pre-operative Diagnosis: malpresentation: nettie breech    Post-operative Diagnosis: malpresentation: nettie breech    Planned Procedure:  Primary     Surgeon: Pauly Wright MD     Assistants: None    Anesthesia: Spinal anesthesia    ASA Class: 2      Procedure Details     The risks, benefits, indications and alternatives of the procedure were reviewed with the patient and informed consent was obtained. The patient was taken to the Operating Room with an IV running. She was placed in the dorsal supine position, with a leftward tilt, then prepped and draped in the usual sterile fashion. Spinal anesthesia was then administrated without difficulty. A Pfannenstiel skin incision was made approximately 2cm above the symphysis pubis and extended down sharply to the rectus fascia. The fascia was then nicked in the midline and extended laterally. The muscles of the anterior abdominal wall were . The fascia was then grasped and the fascial incision extended laterally via superior and inferior traction. The peritoneum was next identified, and entered bluntly with the digits. The peritoneal incision was also extended via traction, taking care to note the position of the bladder. Next a bladder blade was inserted. A vesicouterine incision was made with the Metzembaum scissors, and a bladder flap gently created. The bladder blade was then reinserted. Next, the uterine incision was made with the scalpel, in transverse, elliptical fashion. The uterine incision was extended laterally via traction. The bladder blade was then removed, and the fetal vertex delivered through the uterine incision without difficulty. Nares and mouth were suctioned on the sterile field. The remainder of the infant was then delivered, and infant passed to the awaiting NICU team. Next, the placenta was delivered manually, taking care to remove all  membranes. The uterus was then exteriorized, and cleared of all clot and debris. The uterine incision was closed with 0-Vicryl in running, locking fashion. Excellent hemostasis was noted. Copious irrigation of the posterior cul-de-sac was performed with warm, sterile saline. The uterus was returned to the abdominal cavity, and the gutters cleared of all clot and debris. Interceed was placed over the uterine incision and over the body of the uterus to prevent future adhesions. The fascia was then closed with 0-Vicryl in running fashion. Next Interceed was layered over the fascia to prevent future adhesions. The skin was subsequently closed with Stratafix suture in 2 layers. The skin was then dressed with a Prineo gauze.    Findings:  VFI  In breech, OA position, clear,no nuchal cord. Manual extraction of  Normal placenta and cord. Apgars         APGARS  One minute Five minutes Ten minutes Fifteen minutes Twenty minutes   Skin color: 0   1             Heart rate: 2   2             Grimace: 2   2              Muscle tone: 2   2              Breathin   2              Totals: 8   9              , Weight: 3240 g (7 lb 2.3 oz)  Length: 18.5  in. NICU/Nursery Present.    Estimated Blood Loss:  600ml           Drains: 200ml           Total IV Fluids: 1200ml           Specimens: Placenta, cord blood           Implants: Interceed x 2           Complications:  None; patient tolerated the procedure well.           Disposition: PACU - hemodynamically stable.           Condition: stable    Attending Attestation: I performed the procedure.    Pauly Wright MD  2020  05:01

## 2020-06-11 LAB
BASOPHILS # BLD AUTO: 0.04 10*3/MM3 (ref 0–0.2)
BASOPHILS NFR BLD AUTO: 0.3 % (ref 0–1.5)
DEPRECATED RDW RBC AUTO: 47.7 FL (ref 37–54)
EOSINOPHIL # BLD AUTO: 0.09 10*3/MM3 (ref 0–0.4)
EOSINOPHIL NFR BLD AUTO: 0.8 % (ref 0.3–6.2)
ERYTHROCYTE [DISTWIDTH] IN BLOOD BY AUTOMATED COUNT: 12.7 % (ref 12.3–15.4)
HCT VFR BLD AUTO: 32.7 % (ref 34–46.6)
HGB BLD-MCNC: 11 G/DL (ref 12–15.9)
IMM GRANULOCYTES # BLD AUTO: 0.05 10*3/MM3 (ref 0–0.05)
IMM GRANULOCYTES NFR BLD AUTO: 0.4 % (ref 0–0.5)
LYMPHOCYTES # BLD AUTO: 2.2 10*3/MM3 (ref 0.7–3.1)
LYMPHOCYTES NFR BLD AUTO: 18.8 % (ref 19.6–45.3)
MCH RBC QN AUTO: 34.5 PG (ref 26.6–33)
MCHC RBC AUTO-ENTMCNC: 33.6 G/DL (ref 31.5–35.7)
MCV RBC AUTO: 102.5 FL (ref 79–97)
MONOCYTES # BLD AUTO: 0.69 10*3/MM3 (ref 0.1–0.9)
MONOCYTES NFR BLD AUTO: 5.9 % (ref 5–12)
NEUTROPHILS # BLD AUTO: 8.66 10*3/MM3 (ref 1.7–7)
NEUTROPHILS NFR BLD AUTO: 73.8 % (ref 42.7–76)
NRBC BLD AUTO-RTO: 0 /100 WBC (ref 0–0.2)
PLATELET # BLD AUTO: 262 10*3/MM3 (ref 140–450)
PMV BLD AUTO: 10 FL (ref 6–12)
RBC # BLD AUTO: 3.19 10*6/MM3 (ref 3.77–5.28)
WBC NRBC COR # BLD: 11.73 10*3/MM3 (ref 3.4–10.8)

## 2020-06-11 PROCEDURE — 25010000002 KETOROLAC TROMETHAMINE PER 15 MG: Performed by: OBSTETRICS & GYNECOLOGY

## 2020-06-11 PROCEDURE — 25010000002 CEFEPIME PER 500 MG: Performed by: OBSTETRICS & GYNECOLOGY

## 2020-06-11 PROCEDURE — 85025 COMPLETE CBC W/AUTO DIFF WBC: CPT | Performed by: OBSTETRICS & GYNECOLOGY

## 2020-06-11 RX ADMIN — KETOROLAC TROMETHAMINE 30 MG: 30 INJECTION, SOLUTION INTRAMUSCULAR; INTRAVENOUS at 13:01

## 2020-06-11 RX ADMIN — PRENATAL VIT W/ FE FUMARATE-FA TAB 27-0.8 MG 1 TABLET: 27-0.8 TAB at 08:29

## 2020-06-11 RX ADMIN — CEFEPIME HYDROCHLORIDE 2 G: 2 INJECTION, POWDER, FOR SOLUTION INTRAVENOUS at 08:29

## 2020-06-11 RX ADMIN — KETOROLAC TROMETHAMINE 30 MG: 30 INJECTION, SOLUTION INTRAMUSCULAR; INTRAVENOUS at 00:04

## 2020-06-11 RX ADMIN — METRONIDAZOLE 500 MG: 500 INJECTION, SOLUTION INTRAVENOUS at 06:57

## 2020-06-11 RX ADMIN — CEFEPIME HYDROCHLORIDE 2 G: 2 INJECTION, POWDER, FOR SOLUTION INTRAVENOUS at 15:39

## 2020-06-11 RX ADMIN — KETOROLAC TROMETHAMINE 30 MG: 30 INJECTION, SOLUTION INTRAMUSCULAR; INTRAVENOUS at 06:46

## 2020-06-11 RX ADMIN — CEFEPIME HYDROCHLORIDE 2 G: 2 INJECTION, POWDER, FOR SOLUTION INTRAVENOUS at 00:04

## 2020-06-11 RX ADMIN — METRONIDAZOLE 500 MG: 500 INJECTION, SOLUTION INTRAVENOUS at 01:54

## 2020-06-11 RX ADMIN — IBUPROFEN 800 MG: 800 TABLET ORAL at 20:13

## 2020-06-11 NOTE — ANESTHESIA POSTPROCEDURE EVALUATION
Patient: Ruby Chan    Procedure Summary     Date:  06/10/20 Room / Location:  [facility] LABOR DELIVERY   [facility] LABOR DELIVERY    Anesthesia Start:  112 Anesthesia Stop:  123    Procedure:  PRIMARY  SECTION  WITHOUT TUBAL LIGATION (N/A Abdomen) Diagnosis:  (MALPRESENTATION - BREECH)    Surgeon:  Pauly Wright MD Provider:  Lori Godoy CRNA    Anesthesia Type:  spinal ASA Status:  2          Anesthesia Type: spinal    Vitals  Vitals Value Taken Time   /61 2020 11:00 AM   Temp 97.2 °F (36.2 °C) 2020  7:45 AM   Pulse 83 2020 11:00 AM   Resp 18 2020 11:00 AM   SpO2 98 % 2020 11:00 AM           Post Anesthesia Care and Evaluation    Anesthetic complications: No anesthetic complications  Post Neuraxial Block status: Motor and sensory function returned to baseline and No signs or symptoms of PDPH

## 2020-06-11 NOTE — PLAN OF CARE
Problem: Patient Care Overview  Goal: Plan of Care Review  Outcome: Ongoing (interventions implemented as appropriate)  Flowsheets (Taken 2020 1500)  Progress: improving  Plan of Care Reviewed With: patient; spouse  Outcome Summary: VSS. FF ML U2 scant lochia. ALT CDI with binder off. Voiding and ambulating in hallway frequently. Toradol given for pain. Bonding well with

## 2020-06-11 NOTE — PROGRESS NOTES
"[facility]   Section Postpartum Delivery Progress Note    Subjective   Postpartum Day 1:  Delivery    The patient feels well.  Her pain is well controlled with prescribed pain medications.   She is ambulating well.  Patient describes her bleeding as moderate lochia.    Breastfeeding: declines.    Objective     Vital Signs Range for the last 24 hours  Temperature: Temp:  [97 °F (36.1 °C)-97.9 °F (36.6 °C)] 97.9 °F (36.6 °C)   Temp Source: Temp src: Oral   BP: BP: ()/(47-87) 103/67   Pulse: Heart Rate:  [67-96] 76   Respirations: Resp:  [18-20] 18   SPO2: SpO2:  [91 %-96 %] 96 %   O2 Amount(L/min):     O2 Devices     Weight: Weight:  [120 kg (264 lb)] 120 kg (264 lb)     Admit Height:  Height: 175.3 cm (69\")      Physical Exam:  General:  no acute distresss.  Abdomen: Fundus: appropriate, firm, non tender  Extremities: normal, atraumatic, no cyanosis, and trace edema.   Incision: clean, dry & intact    Lab results reviewed:  Yes   Rubella:  No results found for: RUBELLAIGGIN Nurse Transcribed from prenatal record --  No components found for: EXTRUBELQUAL  Rh Status:    RH type   Date Value Ref Range Status   06/10/2020 Positive  Final     Immunizations: There is no immunization history for the selected administration types on file for this patient.    Assessment/Plan       * No active hospital problems. *      Ruby Chan is Day 1  post-partum  , Low Transverse    .      Plan:  Continue current care.      Pauly Wright MD  2020  05:30  "

## 2020-06-12 VITALS
BODY MASS INDEX: 39.1 KG/M2 | OXYGEN SATURATION: 98 % | RESPIRATION RATE: 20 BRPM | HEIGHT: 69 IN | TEMPERATURE: 97.4 F | DIASTOLIC BLOOD PRESSURE: 58 MMHG | SYSTOLIC BLOOD PRESSURE: 119 MMHG | WEIGHT: 264 LBS | HEART RATE: 91 BPM

## 2020-06-12 RX ORDER — OXYCODONE HYDROCHLORIDE AND ACETAMINOPHEN 5; 325 MG/1; MG/1
1 TABLET ORAL EVERY 6 HOURS PRN
Qty: 42 TABLET | Refills: 0 | Status: SHIPPED | OUTPATIENT
Start: 2020-06-12 | End: 2020-06-23

## 2020-06-12 RX ORDER — IBUPROFEN 800 MG/1
800 TABLET ORAL EVERY 8 HOURS PRN
Qty: 90 TABLET | Refills: 2 | Status: ON HOLD | OUTPATIENT
Start: 2020-06-12 | End: 2021-09-29

## 2020-06-12 RX ADMIN — OXYCODONE HYDROCHLORIDE AND ACETAMINOPHEN 1 TABLET: 5; 325 TABLET ORAL at 02:48

## 2020-06-12 RX ADMIN — PRENATAL VIT W/ FE FUMARATE-FA TAB 27-0.8 MG 1 TABLET: 27-0.8 TAB at 08:42

## 2020-06-12 RX ADMIN — IBUPROFEN 800 MG: 800 TABLET ORAL at 06:25

## 2020-06-12 NOTE — DISCHARGE SUMMARY
Discharge Summary     Gauri Chan  : 1990  MRN: [id number]  [facility]: [id number]    Date of Admission: 6/10/2020   Date of Discharge:  2020   Delivering Physician: Pauly Wright        Admission Diagnosis: 1. Breech presentation [O32.1XX0]   Discharge Diagnosis: 1. Pregnancy at 39w1d - delivered       Procedures: 6/10/2020  - , Low Transverse       Hospital Course  Patient is a 29 y.o.  who at 39w1d had a  section.  Her postoperative course was without complications.  On POD #2 she was ready for discharge.  She had normal lochia and pain well controlled with oral medications.    Infant  female  fetus weighing 3240 g (7 lb 2.3 oz)   Apgars -  8  @ 1 minute /  9  @ 5 minutes.    Discharge labs  Lab Results   Component Value Date    WBC 11.73 (H) 2020    HGB 11.0 (L) 2020    HCT 32.7 (L) 2020     2020       Discharge Medications     Discharge Medications      New Medications      Instructions Start Date   ibuprofen 800 MG tablet  Commonly known as:  ADVIL,MOTRIN   800 mg, Oral, Every 8 Hours PRN      oxyCODONE-acetaminophen 5-325 MG per tablet  Commonly known as:  PERCOCET   1 tablet, Oral, Every 6 Hours PRN         Continue These Medications      Instructions Start Date   ondansetron 4 MG tablet  Commonly known as:  ZOFRAN   4 mg, Oral, Every 8 Hours PRN      PRENATAL VITAMINS PO   Oral         Stop These Medications    cetirizine 10 MG tablet  Commonly known as:  zyrTEC            Discharge Disposition Home or Self Care   Condition on Discharge: good   Follow-up: 2 weeks with MD Pauly Campos MD  2020

## 2020-06-12 NOTE — PROGRESS NOTES
"Psychiatric   Section Postpartum Delivery Progress Note    Subjective   Postpartum Day 2:  Delivery    The patient feels well.  Her pain is well controlled with prescribed pain medications.   She is ambulating well.  Patient describes her bleeding as thin lochia.    Breastfeeding: declines.    Objective     Vital Signs Range for the last 24 hours  Temperature: Temp:  [97.3 °F (36.3 °C)-97.9 °F (36.6 °C)] 97.5 °F (36.4 °C)   Temp Source: Temp src: Temporal   BP: BP: (110-130)/(61-67) 119/62   Pulse: Heart Rate:  [83-96] 86   Respirations: Resp:  [16-18] 18   SPO2: SpO2:  [97 %-98 %] 98 %   O2 Amount(L/min):     O2 Devices Device (Oxygen Therapy): room air   Weight:       Admit Height:  Height: 175.3 cm (69\")      Physical Exam:  General:  no acute distresss.  Abdomen: Fundus: appropriate, firm, non tender  Extremities: normal, atraumatic, no cyanosis, and trace edema.    Incision: clean, dry & intact    Lab results reviewed:  Yes   Rubella:  No results found for: RUBELLAIGGIN Nurse Transcribed from prenatal record --  No components found for: EXTRUBELQUAL  Rh Status:    RH type   Date Value Ref Range Status   06/10/2020 Positive  Final     Immunizations: There is no immunization history for the selected administration types on file for this patient.    Assessment/Plan       * No active hospital problems. *      Ruby Chan is Day 2  post-partum  , Low Transverse    .      Plan:  Discharge home with standard precautions and return to clinic in 4-6 weeks.      Pauly Wright MD  2020  08:03  "

## 2020-06-19 LAB
CYTO UR: NORMAL
LAB AP CASE REPORT: NORMAL
LAB AP CLINICAL INFORMATION: NORMAL
PATH REPORT.FINAL DX SPEC: NORMAL
PATH REPORT.GROSS SPEC: NORMAL

## 2020-06-20 ENCOUNTER — HOSPITAL ENCOUNTER (EMERGENCY)
Facility: HOSPITAL | Age: 30
Discharge: HOME | End: 2020-06-21
Attending: INTERNAL MEDICINE | Admitting: INTERNAL MEDICINE

## 2020-06-20 PROCEDURE — 99283 EMERGENCY DEPT VISIT LOW MDM: CPT

## 2020-06-21 VITALS
WEIGHT: 260.2 LBS | TEMPERATURE: 98.3 F | OXYGEN SATURATION: 95 % | RESPIRATION RATE: 16 BRPM | DIASTOLIC BLOOD PRESSURE: 84 MMHG | HEART RATE: 72 BPM | SYSTOLIC BLOOD PRESSURE: 146 MMHG | HEIGHT: 69 IN | BODY MASS INDEX: 38.54 KG/M2

## 2020-06-21 NOTE — ED PROVIDER NOTES
Subjective   Patient is status post  he states that she has been doing quite well status post procedure however tonight that she coughed and then moved her abdomen and then when she looked at her  wound it was draining a serosanguineous fluid.  She denies weakness or lightheadedness she denies any nausea or vomiting and she denies abdominal pain but is concerned because of the serosanguineous fluid draining from her incision.  She states that the incision has not  nor she noted any purulence.          Review of Systems   Constitutional: Negative for chills, fatigue and fever.   HENT: Negative for congestion and facial swelling.    Eyes: Negative for photophobia, discharge and visual disturbance.   Respiratory: Negative for cough, shortness of breath and wheezing.    Cardiovascular: Negative for chest pain, palpitations and leg swelling.   Gastrointestinal: Negative for abdominal pain, diarrhea, nausea and vomiting.   Endocrine: Negative for cold intolerance and heat intolerance.   Genitourinary: Negative for difficulty urinating and urgency.   Musculoskeletal: Negative for arthralgias, joint swelling and myalgias.   Skin: Positive for wound. Negative for color change and pallor.   Neurological: Negative for dizziness and light-headedness.   Hematological: Negative for adenopathy. Does not bruise/bleed easily.   Psychiatric/Behavioral: Negative for agitation, behavioral problems and confusion.       Past Medical History:   Diagnosis Date   • Allergies    • Herpes     fever blisters       No Known Allergies    Past Surgical History:   Procedure Laterality Date   • CARPAL TUNNEL RELEASE Bilateral    •  SECTION N/A 6/10/2020    Procedure: PRIMARY  SECTION  WITHOUT TUBAL LIGATION;  Surgeon: Pauly Wright MD;  Location: [facility] LABOR DELIVERY;  Service: Obstetrics/Gynecology;  Laterality: N/A;       History reviewed. No pertinent family history.    Social History      Socioeconomic History   • Marital status:      Spouse name: Not on file   • Number of children: Not on file   • Years of education: Not on file   • Highest education level: Not on file   Tobacco Use   • Smoking status: Current Every Day Smoker     Packs/day: 0.50     Years: 5.00     Pack years: 2.50     Types: Cigarettes   • Smokeless tobacco: Never Used   Substance and Sexual Activity   • Alcohol use: Not Currently     Frequency: Never   • Drug use: Never   • Sexual activity: Yes     Partners: Male           Objective   Physical Exam   Constitutional: She is oriented to person, place, and time. She appears well-developed and well-nourished.   HENT:   Head: Normocephalic and atraumatic.   Mouth/Throat: Oropharynx is clear and moist.   Eyes: Pupils are equal, round, and reactive to light. Conjunctivae and EOM are normal.   Neck: Normal range of motion. Neck supple.   Cardiovascular: Normal rate, regular rhythm, normal heart sounds and intact distal pulses.   Pulmonary/Chest: Effort normal and breath sounds normal.   Abdominal: Soft. Bowel sounds are normal. She exhibits no distension.   Musculoskeletal: Normal range of motion. She exhibits no edema.   Neurological: She is alert and oriented to person, place, and time. No cranial nerve deficit.   Skin: Skin is warm and dry.   Patient had a recent  section with  scar which is clean dry and intact however there is some serosanguineous fluid draining from the center of the scar.   Psychiatric: She has a normal mood and affect. Her behavior is normal. Thought content normal.   Nursing note and vitals reviewed.      Procedures           ED Course  ED Course as of 606   Sun 2020   0605 I called and discussed the case with Dr. Wright who agreed to see the patient in follow-up on Monday.  She recommends discontinuing dressing changes and no further intervention.    [RW]      ED Course User Index  [RW] Mark Brown MD                                            MDM    Final diagnoses:   Dehiscence of  section wound, postpartum            Mark Brown MD  [phone]

## 2020-06-21 NOTE — DISCHARGE INSTRUCTIONS
Ms. Chan,    I have discussed your case with Dr. Harden your OB/GYN who feels that you should continue to apply dressings to the wound change them out regularly to keep the wound dry.  And to follow-up with her office on Monday.    Sincerely,    YUNI Brown MD

## 2020-07-08 ENCOUNTER — HOSPITAL ENCOUNTER (OUTPATIENT)
Dept: WOUND CARE | Age: 30
Discharge: HOME | End: 2020-07-08
Payer: MEDICAID

## 2020-07-08 VITALS
DIASTOLIC BLOOD PRESSURE: 91 MMHG | WEIGHT: 232 LBS | HEART RATE: 94 BPM | RESPIRATION RATE: 18 BRPM | HEIGHT: 69 IN | TEMPERATURE: 98.5 F | BODY MASS INDEX: 34.36 KG/M2 | SYSTOLIC BLOOD PRESSURE: 133 MMHG

## 2020-07-08 PROBLEM — L98.492 ABDOMINAL WALL SKIN ULCER, WITH FAT LAYER EXPOSED (HCC): Status: ACTIVE | Noted: 2020-07-08

## 2020-07-08 PROBLEM — Z72.0 TOBACCO ABUSE: Status: ACTIVE | Noted: 2020-07-08

## 2020-07-08 PROBLEM — T81.31XA DEHISCENCE OF EXTERNAL SURGICAL WOUND: Status: ACTIVE | Noted: 2020-07-08

## 2020-07-08 PROBLEM — Z98.891 H/O: C-SECTION: Status: ACTIVE | Noted: 2020-07-08

## 2020-07-08 PROCEDURE — 99213 OFFICE O/P EST LOW 20 MIN: CPT

## 2020-07-08 PROCEDURE — 99213 OFFICE O/P EST LOW 20 MIN: CPT | Performed by: NURSE PRACTITIONER

## 2020-07-08 RX ORDER — CETIRIZINE HYDROCHLORIDE 10 MG/1
10 TABLET ORAL DAILY PRN
COMMUNITY

## 2020-07-08 RX ORDER — ONDANSETRON 4 MG/1
4 TABLET, FILM COATED ORAL EVERY 8 HOURS PRN
COMMUNITY

## 2020-07-08 RX ORDER — OMEGA-3 FATTY ACIDS/FISH OIL 300-1000MG
1 CAPSULE ORAL PRN
COMMUNITY

## 2020-07-08 RX ORDER — SODIUM HYPOCHLORITE 1.25 MG/ML
SOLUTION TOPICAL
Qty: 1 BOTTLE | Refills: 1 | Status: SHIPPED | OUTPATIENT
Start: 2020-07-08

## 2020-07-08 ASSESSMENT — VISUAL ACUITY: OU: 1

## 2020-07-08 NOTE — PROGRESS NOTES
Patient Care Team:  IAN Montaño - NP as PCP - General  CATHI Ortega (Physician Assistant Medical)    TODAY'S DATE:  2020     HISTORY of PRESENTILLNESS HPI   Alisha Magaña is a 34 y.o. female who presents today for wound evaluation. Ms. Tyrell Chauhan had a  6/10/20 per Dr. Dana Johnson. She began to have drainage after a sneeze. Dr. Dana Johnson saw her yesterday and sent her to wound care. Wound Type:non-healing surgical  Wound Location:abdominal wall  surgical incision  Modifying factors:obesity, smoking and malnutrition    Patient Active Problem List   Diagnosis Code    BMI 38.0-38.9,adult Z68.38    Snoring R06.83    Somnolence, daytime R40.0    Obstructive sleep apnea G47.33    CPAP (continuous positive airway pressure) dependence Z99.89    Abdominal wall skin ulcer, with fat layer exposed ([facility] [address] L98.492    H/O:  Z98.891    Dehiscence of external surgical wound T81. 31XA    Tobacco abuse Z72.0       She reports she developed a wound on  abdomen. This started 1 month(s) ago. She believes this is not healing. She has been applying nothing. She has not had  fever orchills. She has a history of . Alisha Magaña is a 34 y.o. female with the following history reviewed and recorded in [facility]:    Current Outpatient Medications   Medication Sig Dispense Refill    ibuprofen (ADVIL;MOTRIN) 200 MG CAPS Take 1 capsule by mouth as needed for Fever      cetirizine (ZYRTEC) 10 MG tablet Take 10 mg by mouth daily as needed for Allergies      sodium hypochlorite (DAKINS) 0.125 % SOLN external solution Apply topically as directed twice daily. 1 Bottle 1    ondansetron (ZOFRAN) 4 MG tablet Take 4 mg by mouth every 8 hours as needed      FLUoxetine (PROZAC) 10 MG capsule Take 10 mg by mouth daily Indications: Patient reports taking it at nighttime        No current facility-administered medications for this encounter. Allergies: Patient has no known allergies.   Past Medical History:   Diagnosis Date    Allergies     Anxiety     CPAP (continuous positive airway pressure) dependence     6cm to 20cm    Obstructive sleep apnea     AHI: 8.3 per HST, 2018       Past Surgical History:   Procedure Laterality Date    CARPAL TUNNEL RELEASE Bilateral      SECTION  06/10/2020    CYST REMOVAL Left      History reviewed. No pertinent family history. Social History     Tobacco Use    Smoking status: Current Every Day Smoker     Packs/day: 0.50    Smokeless tobacco: Never Used    Tobacco comment: or less   Substance Use Topics    Alcohol use: Yes     Comment: occassional wine cooler         Review of Systems    Review of Systems   Skin: Positive for wound. All other systems reviewed and are negative. All other review of systems are negative. Physical Exam    BP (!) 133/91   Pulse 94   Temp 98.5 °F (36.9 °C) (Temporal)   Resp 18   Ht 5' 9\" (1.753 m)   Wt 232 lb (105.2 kg)   BMI 34.26 kg/m²     Physical Exam  Vitals signs reviewed. Constitutional:       Appearance: Normal appearance. She is obese. HENT:      Head: Normocephalic and atraumatic. Right Ear: External ear normal.      Left Ear: External ear normal.      Nose: Nose normal.   Eyes:      General: Lids are normal. Lids are everted, no foreign bodies appreciated. Vision grossly intact. Gaze aligned appropriately. Neck:      Musculoskeletal: Normal range of motion. Cardiovascular:      Rate and Rhythm: Normal rate and regular rhythm. Pulses: Normal pulses. Heart sounds: Normal heart sounds. Pulmonary:      Effort: Pulmonary effort is normal.      Breath sounds: Normal breath sounds. Abdominal:      General: Bowel sounds are normal.   Musculoskeletal: Normal range of motion. Skin:     General: Skin is warm and dry. Neurological:      Mental Status: She is alert.    Psychiatric:         Mood and Affect: Mood normal.         Behavior: Behavior normal.         Thought Content: Thought content normal.         Judgment: Judgment normal.             Post Debridement Measurements and Assessment:    The patientspain isPain Level: 0  . Please refer to nursing measurements and assessment regarding wound pre and postdebridement.     Wound 07/08/20 Abdomen Mid;Lower Wound 1 Non Healing Surgical, Mid Lower abdomen  (Active)   Wound Image     7/8/2020  2:36 PM   Wound Non-Healing Surgical 7/8/2020  2:36 PM   Dressing Status Old drainage 7/8/2020  2:36 PM   Wound Cleansed Rinsed/Irrigated with saline 7/8/2020  2:36 PM   Wound Length (cm) 0.3 cm 7/8/2020  2:36 PM   Wound Width (cm) 2.1 cm 7/8/2020  2:36 PM   Wound Depth (cm) 3.5 cm 7/8/2020  2:36 PM   Wound Surface Area (cm^2) 0.63 cm^2 7/8/2020  2:36 PM   Wound Volume (cm^3) 2.2 cm^3 7/8/2020  2:36 PM   Post-Procedure Length (cm) 0.3 cm 7/8/2020  2:50 PM   Post-Procedure Width (cm) 2.1 cm 7/8/2020  2:50 PM   Post-Procedure Depth (cm) 3.5 cm 7/8/2020  2:50 PM   Post-Procedure Surface Area (cm^2) 0.63 cm^2 7/8/2020  2:50 PM   Post-Procedure Volume (cm^3) 2.2 cm^3 7/8/2020  2:50 PM   Distance Tunneling (cm) 7.8 cm 7/8/2020  2:36 PM   Tunneling Position ___ O'Clock 9 7/8/2020  2:36 PM   Undermining Starts ___ O'Clock 0 7/8/2020  2:36 PM   Undermining Ends___ O'Clock 0 7/8/2020  2:36 PM   Undermining Maxium Distance (cm) 0 7/8/2020  2:36 PM   Wound Assessment Slough;Drainage;Yellow 7/8/2020  2:36 PM   Drainage Amount Copious 7/8/2020  2:36 PM   Drainage Description Yellow 7/8/2020  2:36 PM   Odor Mild 7/8/2020  2:36 PM   Margins Unattached edges 7/8/2020  2:36 PM   Exposed structure Fascia 7/8/2020  2:36 PM   Pratima-wound Assessment Swelling;Red 7/8/2020  2:36 PM   Non-staged Wound Description Full thickness 7/8/2020  2:36 PM   [place]%Wound Bed 10 7/8/2020  2:36 PM   Red%Wound Bed 0 7/8/2020  2:36 PM   Yellow%Wound Bed 90 7/8/2020  2:36 PM   Black%Wound Bed 0 7/8/2020  2:36 PM   Purple%Wound Bed 0 7/8/2020  2:36 PM   Other%Wound Bed 0 7/8/2020  2:36 PM Number of days: 0          Assessment    1. BMI 38.0-38.9,adult    2. Abdominal wall skin ulcer, with fat layer exposed ([address]    3. H/O:     4. Disruption of external surgical wound, subsequent encounter    5. Tobacco abuse          Plan    1. Lab work  2. Surgical consult    Planfor wound - Dress per physician order  Treatment:     Compression : No   Offloading : No   Dressing : see AVS    Discussed importance of smoking cessation, nutrition, wound care, and risk of bathing. Patient understanding and questions answered. I spent a total of  15 minutes face to face with the patient. Over 100% of that time was spent on counseling and care coordination. Patient was told that if symptoms worsen or new symptoms develop they are to go to the emergency department immediately. Patient was educated on diagnosis and treatment plan. All of patient's questions were answered, and the patient understands the discharge plan. Discussed appropriate home care of this wound. Wound redressed. Patient instructions were given.   Recommend no smoking  Offloading instructions given

## 2020-07-14 ENCOUNTER — HOSPITAL ENCOUNTER (OUTPATIENT)
Dept: WOUND CARE | Age: 30
Discharge: HOME | End: 2020-07-14
Payer: MEDICAID

## 2020-07-14 VITALS
HEIGHT: 69 IN | BODY MASS INDEX: 34.36 KG/M2 | WEIGHT: 232 LBS | RESPIRATION RATE: 16 BRPM | HEART RATE: 90 BPM | TEMPERATURE: 97.4 F

## 2020-07-14 PROCEDURE — 99213 OFFICE O/P EST LOW 20 MIN: CPT

## 2020-07-14 PROCEDURE — 99212 OFFICE O/P EST SF 10 MIN: CPT | Performed by: SURGERY

## 2020-07-14 ASSESSMENT — PAIN SCALES - GENERAL: PAINLEVEL_OUTOF10: 0

## 2020-07-14 NOTE — PROGRESS NOTES
[address]  Progress Note and Procedure Note      Parminder Pereira  AGE: 34 y.o. GENDER: female  : 1990  TODAY'S DATE:  2020    Subjective:     CHIEF COMPLAINT abdominal wound     HISTORY of PRESENT ILLNESS HPI   Parminder Pereira is a 34 y.o. female who presents today for wound/ulcer evaluation. Ulcer Type:non-healing surgical  Ulcer/Wound Location: section  Contributing factors:chronic pressure, shear force and obesity    Patient Active Problem List   Diagnosis Code    BMI 38.0-38.9,adult Z68.38    Snoring R06.83    Somnolence, daytime R40.0    Obstructive sleep apnea G47.33    CPAP (continuous positive airway pressure) dependence Z99.89    Abdominal wall skin ulcer, with fat layer exposed ([address] L98.492    H/O:  Z98.891    Dehiscence of external surgical wound T81. 31XA    Tobacco abuse Z72.0       ALLERGIES    No Known Allergies        Objective:      Pulse 90   Temp 97.4 °F (36.3 °C) (Temporal)   Resp 16   Ht 5' 9\" (1.753 m)   Wt 232 lb (105.2 kg)   BMI 34.26 kg/m²         Assessment:      Problem List Items Addressed This Visit     Abdominal wall skin ulcer, with fat layer exposed ([address]    H/O:     Dehiscence of external surgical wound    Tobacco abuse          The patients pain isPain Level: 0  . Wound(s) is unchanged. Please refer to nursing measurements and assessment regarding wound pre and post debridement.   Wound 20 Abdomen Mid;Lower Wound 1 Non Healing Surgical, Mid Lower abdomen  (Active)   Wound Image   20 1100   Wound Non-Healing Surgical 20 1100   Dressing Status Old drainage 20 1100   Dressing Changed Changed/New 20 1450   Wound Cleansed Rinsed/Irrigated with saline 20 1100   Wound Length (cm) 2.5 cm 20 1100   Wound Width (cm) 1.5 cm 20 1100   Wound Depth (cm) 1.6 cm 20 1100   Wound Surface Area (cm^2) 3.75 cm^2 20 1100   Change in Wound Size % (l*w) -495.24 07/14/20 1100   Wound Volume (cm^3) 6 cm^3 07/14/20 1100   Wound Healing % -173 07/14/20 1100   Post-Procedure Length (cm) 0.3 cm 07/08/20 1450   Post-Procedure Width (cm) 2.1 cm 07/08/20 1450   Post-Procedure Depth (cm) 3.5 cm 07/08/20 1450   Post-Procedure Surface Area (cm^2) 0.63 cm^2 07/08/20 1450   Post-Procedure Volume (cm^3) 2.2 cm^3 07/08/20 1450   Distance Tunneling (cm) 6.4 cm 07/14/20 1100   Tunneling Position ___ O'Clock 9 07/14/20 1100   Undermining Starts ___ O'Clock 0 07/14/20 1100   Undermining Ends___ O'Clock 0 07/14/20 1100   Undermining Maxium Distance (cm) 0 07/14/20 1100   Wound Assessment Slough;Drainage;Yellow 07/14/20 1100   Drainage Amount Copious 07/14/20 1100   Drainage Description Yellow 07/14/20 1100   Odor Mild 07/14/20 1100   Margins Unattached edges 07/14/20 1100   Exposed structure Fascia 07/14/20 1100   Pratima-wound Assessment Swelling;Red 07/14/20 1100   Non-staged Wound Description Full thickness 07/14/20 1100   [place]%Wound Bed 25 07/14/20 1100   Red%Wound Bed 0 07/14/20 1100   Yellow%Wound Bed 75 07/14/20 1100   Black%Wound Bed 0 07/14/20 1100   Purple%Wound Bed 0 07/14/20 1100   Other%Wound Bed 0 07/14/20 1100   Number of days: 5          Plan:          Plan for wound - Dress per physician order  Treatment:     Compression : No   Offloading : Yes   Dressing : see AVS   Additional Therapy : none     1. Discussed appropriate home care of this wound. Wound redressed. 2. Written patient dismissal instructions given to patient and signed by patient or POA. 3. Follow up: after surgery    The patient has quite a but of tunneling, especially to the patient's right. This would benefit from being debrided and opened surgically and then use a wound vac. The patient expressed understanding and would like to proceed with surgery. I also discussed decreasing smoking and how nicotine affects wound healing. Encouraged protein intake but not too many calories.     Electronically signed by Padmini Ren MD on 7/14/2020 at 11:19 AM

## 2020-07-14 NOTE — PLAN OF CARE
Problem: Wound:  Goal: Will show signs of wound healing; wound closure and no evidence of infection  Description: Will show signs of wound healing; wound closure and no evidence of infection  Outcome: Ongoing     Problem: Smoking cessation:  Goal: Ability to formulate a plan to maintain a tobacco-free life will be supported  Description: Ability to formulate a plan to maintain a tobacco-free life will be supported  Outcome: Ongoing

## 2020-07-14 NOTE — PLAN OF CARE
Problem: Wound:  Goal: Will show signs of wound healing; wound closure and no evidence of infection  Description: Will show signs of wound healing; wound closure and no evidence of infection  7/14/2020 1255 by Carolina Stout RN  Outcome: Ongoing  7/14/2020 1255 by Carolina Stout RN  Outcome: Ongoing     Problem: Smoking cessation:  Goal: Ability to formulate a plan to maintain a tobacco-free life will be supported  Description: Ability to formulate a plan to maintain a tobacco-free life will be supported  7/14/2020 1255 by Carolina Stout RN  Outcome: Ongoing  7/14/2020 1255 by Carolina Stout RN  Outcome: Ongoing

## 2020-07-16 NOTE — PLAN OF CARE
[address]:     [address] f: [phone] f: [phone] [phone] [email or web address]      Ordering Center:     [address],[place] [address]  [address] [facility], [place] [address] [id number] [phone]  WOUND CARE Dept: [address] NUMBER     Patient Information:      Jeannie Siu  [place], [address]    : 1990  AGE: 34 y.o.      GENDER: female   TODAYS DATE:  2020    Insurance:      PRIMARY INSURANCE:  Plan: [place] [address]  Coverage: [employer] MEDICAID  Effective Date: 2016  Group Number: [unfilled]  Subscriber Number: [id number] - (Medicaid Managed)          Patient Wound Information:      Problem List Items Addressed This Visit     Abdominal wall skin ulcer, with fat layer exposed ([facility] [address]    Relevant Orders    Internal Referral to Home Health    H/O:     Relevant Orders    Internal Referral to Home Health    Dehiscence of external surgical wound    Relevant Orders    Internal Referral to Home Health    Tobacco abuse    Relevant Orders    Internal Referral to [address]:     Wound 20 Abdomen Mid;Lower Wound 1 Non Healing Surgical, Mid Lower abdomen  (Active)   Wound Image   20 1100   Wound Non-Healing Surgical 20 1100   Dressing Status Old drainage 20 1100   Dressing Changed Changed/New 20 1450   Wound Cleansed Rinsed/Irrigated with saline 20 1100   Wound Length (cm) 2.5 cm 20 1100   Wound Width (cm) 1.5 cm 20 1100   Wound Depth (cm) 1.6 cm 20 1100   Wound Surface Area (cm^2) 3.75 cm^2 20 1100   Change in Wound Size % (l*w) -495.24 20 1100   Wound Volume (cm^3) 6 cm^3 20 1100   Wound Healing % -173 20 1100   Post-Procedure Length (cm) 0.3 cm 20 1450   Post-Procedure Width (cm) 2.1 cm 20 1450 Post-Procedure Depth (cm) 3.5 cm 07/08/20 1450   Post-Procedure Surface Area (cm^2) 0.63 cm^2 07/08/20 1450   Post-Procedure Volume (cm^3) 2.2 cm^3 07/08/20 1450   Distance Tunneling (cm) 6.4 cm 07/14/20 1100   Tunneling Position ___ O'Clock 9 07/14/20 1100   Undermining Starts ___ O'Clock 0 07/14/20 1100   Undermining Ends___ O'Clock 0 07/14/20 1100   Undermining Maxium Distance (cm) 0 07/14/20 1100   Wound Assessment Slough;Drainage;Yellow 07/14/20 1100   Drainage Amount Copious 07/14/20 1100   Drainage Description Yellow 07/14/20 1100   Odor Mild 07/14/20 1100   Margins Unattached edges 07/14/20 1100   Exposed structure Fascia 07/14/20 1100   Pratima-wound Assessment Swelling;Red 07/14/20 1100   Non-staged Wound Description Full thickness 07/14/20 1100   [place]%Wound Bed 25 07/14/20 1100   Red%Wound Bed 0 07/14/20 1100   Yellow%Wound Bed 75 07/14/20 1100   Black%Wound Bed 0 07/14/20 1100   Purple%Wound Bed 0 07/14/20 1100   Other%Wound Bed 0 07/14/20 1100   Number of days: 7          Supplies Requested :      WOUND #: 1   PRIMARY DRESSING:  Other: Conforming roll gauze   Cover and Secure with: ABD pad     FREQUENCY OF DRESSING CHANGES:  Daily         ADDITIONAL ITEMS:  [] Gloves Small  [x] Gloves Medium [] Gloves Large [] Gloves XLarge  [] Tape 1\" [x] Tape 2\" [x] Tape 3\"  [x] Medipore Tape  [x] Saline  [] Skin Prep   [] Adhesive Remover   [x] Cotton Tip Applicators   [] Other:    Patient Wound(s) Debrided: [x] Yes if yes please add date 7/14/20   [] No  Will have surgical debridement on 7/22/20  Debribement Type: Non-excisional/Selective    Patient currently being seen by Home Health: [] Yes   [x] No    Duration for needed supplies:  []15  [x]30  []60  []90 Days    Provider Information:      PROVIDER'S NAME: Dr Tanika Dudley    NPI: [id number]

## 2020-07-17 ENCOUNTER — HOSPITAL ENCOUNTER (OUTPATIENT)
Dept: PREADMISSION TESTING | Age: 30
Discharge: HOME | End: 2020-07-21
Payer: MEDICAID

## 2020-07-17 VITALS — WEIGHT: 234 LBS | BODY MASS INDEX: 34.66 KG/M2 | HEIGHT: 69 IN

## 2020-07-17 LAB
BASOPHILS ABSOLUTE: 0 K/UL (ref 0–0.2)
BASOPHILS RELATIVE PERCENT: 0.7 % (ref 0–1)
EOSINOPHILS ABSOLUTE: 0.2 K/UL (ref 0–0.6)
EOSINOPHILS RELATIVE PERCENT: 2.9 % (ref 0–5)
HCT VFR BLD CALC: 37 % (ref 37–47)
HEMOGLOBIN: 12.2 G/DL (ref 12–16)
IMMATURE GRANULOCYTES #: 0 K/UL
LYMPHOCYTES ABSOLUTE: 2.3 K/UL (ref 1.1–4.5)
LYMPHOCYTES RELATIVE PERCENT: 37 % (ref 20–40)
MCH RBC QN AUTO: 33.3 PG (ref 27–31)
MCHC RBC AUTO-ENTMCNC: 33 G/DL (ref 33–37)
MCV RBC AUTO: 101.1 FL (ref 81–99)
MONOCYTES ABSOLUTE: 0.4 K/UL (ref 0–0.9)
MONOCYTES RELATIVE PERCENT: 5.7 % (ref 0–10)
NEUTROPHILS ABSOLUTE: 3.3 K/UL (ref 1.5–7.5)
NEUTROPHILS RELATIVE PERCENT: 53.5 % (ref 50–65)
PDW BLD-RTO: 11.9 % (ref 11.5–14.5)
PLATELET # BLD: 342 K/UL (ref 130–400)
PMV BLD AUTO: 10.7 FL (ref 9.4–12.3)
RBC # BLD: 3.66 M/UL (ref 4.2–5.4)
WBC # BLD: 6.1 K/UL (ref 4.8–10.8)

## 2020-07-17 PROCEDURE — 85025 COMPLETE CBC W/AUTO DIFF WBC: CPT

## 2020-07-18 ENCOUNTER — OFFICE VISIT (OUTPATIENT)
Age: 30
End: 2020-07-18

## 2020-07-18 VITALS — TEMPERATURE: 98.1 F | HEART RATE: 68 BPM | OXYGEN SATURATION: 97 %

## 2020-07-21 LAB
REPORT: NORMAL
SARS-COV-2: NOT DETECTED
THIS TEST SENT TO: NORMAL

## 2020-07-22 ENCOUNTER — HOSPITAL ENCOUNTER (OUTPATIENT)
Age: 30
Setting detail: OUTPATIENT SURGERY
Discharge: HOME | End: 2020-07-22
Attending: SURGERY | Admitting: SURGERY
Payer: MEDICAID

## 2020-07-22 ENCOUNTER — PREP FOR PROCEDURE (OUTPATIENT)
Dept: SURGERY | Age: 30
End: 2020-07-22

## 2020-07-22 ENCOUNTER — ANESTHESIA EVENT (OUTPATIENT)
Dept: OPERATING ROOM | Age: 30
End: 2020-07-22
Payer: MEDICAID

## 2020-07-22 ENCOUNTER — ANESTHESIA (OUTPATIENT)
Dept: OPERATING ROOM | Age: 30
End: 2020-07-22
Payer: MEDICAID

## 2020-07-22 VITALS
RESPIRATION RATE: 15 BRPM | DIASTOLIC BLOOD PRESSURE: 56 MMHG | TEMPERATURE: 97 F | OXYGEN SATURATION: 98 % | SYSTOLIC BLOOD PRESSURE: 99 MMHG

## 2020-07-22 VITALS
RESPIRATION RATE: 16 BRPM | HEART RATE: 62 BPM | TEMPERATURE: 97.2 F | BODY MASS INDEX: 39.1 KG/M2 | SYSTOLIC BLOOD PRESSURE: 122 MMHG | HEIGHT: 69 IN | OXYGEN SATURATION: 93 % | DIASTOLIC BLOOD PRESSURE: 79 MMHG | WEIGHT: 264 LBS

## 2020-07-22 LAB — HCG(URINE) PREGNANCY TEST: NEGATIVE

## 2020-07-22 PROCEDURE — 7100000001 HC PACU RECOVERY - ADDTL 15 MIN: Performed by: SURGERY

## 2020-07-22 PROCEDURE — 6360000002 HC RX W HCPCS: Performed by: NURSE ANESTHETIST, CERTIFIED REGISTERED

## 2020-07-22 PROCEDURE — 7100000011 HC PHASE II RECOVERY - ADDTL 15 MIN: Performed by: SURGERY

## 2020-07-22 PROCEDURE — 2580000003 HC RX 258: Performed by: SURGERY

## 2020-07-22 PROCEDURE — 11042 DBRDMT SUBQ TIS 1ST 20SQCM/<: CPT | Performed by: SURGERY

## 2020-07-22 PROCEDURE — 7100000000 HC PACU RECOVERY - FIRST 15 MIN: Performed by: SURGERY

## 2020-07-22 PROCEDURE — 3700000000 HC ANESTHESIA ATTENDED CARE: Performed by: SURGERY

## 2020-07-22 PROCEDURE — 84703 CHORIONIC GONADOTROPIN ASSAY: CPT

## 2020-07-22 PROCEDURE — 3600000014 HC SURGERY LEVEL 4 ADDTL 15MIN: Performed by: SURGERY

## 2020-07-22 PROCEDURE — 3700000001 HC ADD 15 MINUTES (ANESTHESIA): Performed by: SURGERY

## 2020-07-22 PROCEDURE — 3600000004 HC SURGERY LEVEL 4 BASE: Performed by: SURGERY

## 2020-07-22 PROCEDURE — 2709999900 HC NON-CHARGEABLE SUPPLY: Performed by: SURGERY

## 2020-07-22 PROCEDURE — 7100000010 HC PHASE II RECOVERY - FIRST 15 MIN: Performed by: SURGERY

## 2020-07-22 PROCEDURE — 11045 DBRDMT SUBQ TISS EACH ADDL: CPT | Performed by: SURGERY

## 2020-07-22 PROCEDURE — 2500000003 HC RX 250 WO HCPCS: Performed by: NURSE ANESTHETIST, CERTIFIED REGISTERED

## 2020-07-22 RX ORDER — SODIUM CHLORIDE, SODIUM LACTATE, POTASSIUM CHLORIDE, CALCIUM CHLORIDE 600; 310; 30; 20 MG/100ML; MG/100ML; MG/100ML; MG/100ML
INJECTION, SOLUTION INTRAVENOUS CONTINUOUS
Status: DISCONTINUED | OUTPATIENT
Start: 2020-07-22 | End: 2020-07-22 | Stop reason: HOSPADM

## 2020-07-22 RX ORDER — ENALAPRILAT 2.5 MG/2ML
1.25 INJECTION INTRAVENOUS
Status: DISCONTINUED | OUTPATIENT
Start: 2020-07-22 | End: 2020-07-22 | Stop reason: HOSPADM

## 2020-07-22 RX ORDER — HYDROCODONE BITARTRATE AND ACETAMINOPHEN 5; 325 MG/1; MG/1
1 TABLET ORAL EVERY 4 HOURS PRN
Status: DISCONTINUED | OUTPATIENT
Start: 2020-07-22 | End: 2020-07-22 | Stop reason: HOSPADM

## 2020-07-22 RX ORDER — PROMETHAZINE HYDROCHLORIDE 25 MG/1
12.5 TABLET ORAL EVERY 6 HOURS PRN
Status: CANCELLED | OUTPATIENT
Start: 2020-07-22

## 2020-07-22 RX ORDER — ONDANSETRON 2 MG/ML
INJECTION INTRAMUSCULAR; INTRAVENOUS PRN
Status: DISCONTINUED | OUTPATIENT
Start: 2020-07-22 | End: 2020-07-22 | Stop reason: SDUPTHER

## 2020-07-22 RX ORDER — DEXAMETHASONE SODIUM PHOSPHATE 10 MG/ML
INJECTION, SOLUTION INTRAMUSCULAR; INTRAVENOUS PRN
Status: DISCONTINUED | OUTPATIENT
Start: 2020-07-22 | End: 2020-07-22 | Stop reason: SDUPTHER

## 2020-07-22 RX ORDER — SODIUM CHLORIDE 0.9 % (FLUSH) 0.9 %
10 SYRINGE (ML) INJECTION PRN
Status: CANCELLED | OUTPATIENT
Start: 2020-07-22

## 2020-07-22 RX ORDER — MIDAZOLAM HYDROCHLORIDE 1 MG/ML
INJECTION INTRAMUSCULAR; INTRAVENOUS PRN
Status: DISCONTINUED | OUTPATIENT
Start: 2020-07-22 | End: 2020-07-22 | Stop reason: SDUPTHER

## 2020-07-22 RX ORDER — HYDRALAZINE HYDROCHLORIDE 20 MG/ML
5 INJECTION INTRAMUSCULAR; INTRAVENOUS EVERY 10 MIN PRN
Status: DISCONTINUED | OUTPATIENT
Start: 2020-07-22 | End: 2020-07-22 | Stop reason: HOSPADM

## 2020-07-22 RX ORDER — MORPHINE SULFATE 4 MG/ML
2 INJECTION, SOLUTION INTRAMUSCULAR; INTRAVENOUS
Status: DISCONTINUED | OUTPATIENT
Start: 2020-07-22 | End: 2020-07-22 | Stop reason: HOSPADM

## 2020-07-22 RX ORDER — LIDOCAINE HYDROCHLORIDE 10 MG/ML
INJECTION, SOLUTION INFILTRATION; PERINEURAL PRN
Status: DISCONTINUED | OUTPATIENT
Start: 2020-07-22 | End: 2020-07-22 | Stop reason: SDUPTHER

## 2020-07-22 RX ORDER — MORPHINE SULFATE 4 MG/ML
4 INJECTION, SOLUTION INTRAMUSCULAR; INTRAVENOUS
Status: DISCONTINUED | OUTPATIENT
Start: 2020-07-22 | End: 2020-07-22 | Stop reason: HOSPADM

## 2020-07-22 RX ORDER — KETAMINE HYDROCHLORIDE 50 MG/ML
INJECTION, SOLUTION, CONCENTRATE INTRAMUSCULAR; INTRAVENOUS PRN
Status: DISCONTINUED | OUTPATIENT
Start: 2020-07-22 | End: 2020-07-22 | Stop reason: SDUPTHER

## 2020-07-22 RX ORDER — MEPERIDINE HYDROCHLORIDE 50 MG/ML
12.5 INJECTION INTRAMUSCULAR; INTRAVENOUS; SUBCUTANEOUS EVERY 5 MIN PRN
Status: DISCONTINUED | OUTPATIENT
Start: 2020-07-22 | End: 2020-07-22 | Stop reason: HOSPADM

## 2020-07-22 RX ORDER — SODIUM CHLORIDE 0.9 % (FLUSH) 0.9 %
10 SYRINGE (ML) INJECTION EVERY 12 HOURS SCHEDULED
Status: CANCELLED | OUTPATIENT
Start: 2020-07-22

## 2020-07-22 RX ORDER — FENTANYL CITRATE 50 UG/ML
INJECTION, SOLUTION INTRAMUSCULAR; INTRAVENOUS PRN
Status: DISCONTINUED | OUTPATIENT
Start: 2020-07-22 | End: 2020-07-22 | Stop reason: SDUPTHER

## 2020-07-22 RX ORDER — LIDOCAINE HYDROCHLORIDE 40 MG/ML
SOLUTION TOPICAL PRN
Status: DISCONTINUED | OUTPATIENT
Start: 2020-07-22 | End: 2020-07-22 | Stop reason: SDUPTHER

## 2020-07-22 RX ORDER — HYDROCODONE BITARTRATE AND ACETAMINOPHEN 5; 325 MG/1; MG/1
2 TABLET ORAL EVERY 4 HOURS PRN
Status: DISCONTINUED | OUTPATIENT
Start: 2020-07-22 | End: 2020-07-22 | Stop reason: HOSPADM

## 2020-07-22 RX ORDER — HYDROMORPHONE HYDROCHLORIDE 1 MG/ML
0.5 INJECTION, SOLUTION INTRAMUSCULAR; INTRAVENOUS; SUBCUTANEOUS EVERY 5 MIN PRN
Status: DISCONTINUED | OUTPATIENT
Start: 2020-07-22 | End: 2020-07-22 | Stop reason: HOSPADM

## 2020-07-22 RX ORDER — ONDANSETRON 2 MG/ML
4 INJECTION INTRAMUSCULAR; INTRAVENOUS EVERY 6 HOURS PRN
Status: CANCELLED | OUTPATIENT
Start: 2020-07-22

## 2020-07-22 RX ORDER — HYDROMORPHONE HYDROCHLORIDE 1 MG/ML
0.25 INJECTION, SOLUTION INTRAMUSCULAR; INTRAVENOUS; SUBCUTANEOUS EVERY 5 MIN PRN
Status: DISCONTINUED | OUTPATIENT
Start: 2020-07-22 | End: 2020-07-22 | Stop reason: HOSPADM

## 2020-07-22 RX ORDER — PROMETHAZINE HYDROCHLORIDE 25 MG/ML
6.25 INJECTION, SOLUTION INTRAMUSCULAR; INTRAVENOUS
Status: DISCONTINUED | OUTPATIENT
Start: 2020-07-22 | End: 2020-07-22 | Stop reason: HOSPADM

## 2020-07-22 RX ORDER — METOCLOPRAMIDE HYDROCHLORIDE 5 MG/ML
10 INJECTION INTRAMUSCULAR; INTRAVENOUS
Status: DISCONTINUED | OUTPATIENT
Start: 2020-07-22 | End: 2020-07-22 | Stop reason: HOSPADM

## 2020-07-22 RX ORDER — SUCCINYLCHOLINE CHLORIDE 20 MG/ML
INJECTION INTRAMUSCULAR; INTRAVENOUS PRN
Status: DISCONTINUED | OUTPATIENT
Start: 2020-07-22 | End: 2020-07-22 | Stop reason: SDUPTHER

## 2020-07-22 RX ORDER — EPHEDRINE SULFATE 50 MG/ML
INJECTION, SOLUTION INTRAVENOUS PRN
Status: DISCONTINUED | OUTPATIENT
Start: 2020-07-22 | End: 2020-07-22 | Stop reason: SDUPTHER

## 2020-07-22 RX ORDER — PROPOFOL 10 MG/ML
INJECTION, EMULSION INTRAVENOUS PRN
Status: DISCONTINUED | OUTPATIENT
Start: 2020-07-22 | End: 2020-07-22 | Stop reason: SDUPTHER

## 2020-07-22 RX ORDER — KETOROLAC TROMETHAMINE 30 MG/ML
INJECTION, SOLUTION INTRAMUSCULAR; INTRAVENOUS PRN
Status: DISCONTINUED | OUTPATIENT
Start: 2020-07-22 | End: 2020-07-22 | Stop reason: SDUPTHER

## 2020-07-22 RX ORDER — DIPHENHYDRAMINE HYDROCHLORIDE 50 MG/ML
12.5 INJECTION INTRAMUSCULAR; INTRAVENOUS
Status: DISCONTINUED | OUTPATIENT
Start: 2020-07-22 | End: 2020-07-22 | Stop reason: HOSPADM

## 2020-07-22 RX ORDER — MORPHINE SULFATE 4 MG/ML
4 INJECTION, SOLUTION INTRAMUSCULAR; INTRAVENOUS EVERY 5 MIN PRN
Status: DISCONTINUED | OUTPATIENT
Start: 2020-07-22 | End: 2020-07-22 | Stop reason: HOSPADM

## 2020-07-22 RX ORDER — LABETALOL HYDROCHLORIDE 5 MG/ML
5 INJECTION, SOLUTION INTRAVENOUS EVERY 10 MIN PRN
Status: DISCONTINUED | OUTPATIENT
Start: 2020-07-22 | End: 2020-07-22 | Stop reason: HOSPADM

## 2020-07-22 RX ORDER — MORPHINE SULFATE 4 MG/ML
2 INJECTION, SOLUTION INTRAMUSCULAR; INTRAVENOUS EVERY 5 MIN PRN
Status: DISCONTINUED | OUTPATIENT
Start: 2020-07-22 | End: 2020-07-22 | Stop reason: HOSPADM

## 2020-07-22 RX ORDER — HYDROCODONE BITARTRATE AND ACETAMINOPHEN 5; 325 MG/1; MG/1
1 TABLET ORAL EVERY 8 HOURS PRN
Qty: 10 TABLET | Refills: 0 | Status: SHIPPED | OUTPATIENT
Start: 2020-07-22 | End: 2020-07-27

## 2020-07-22 RX ADMIN — FENTANYL CITRATE 100 MCG: 50 INJECTION INTRAMUSCULAR; INTRAVENOUS at 09:19

## 2020-07-22 RX ADMIN — LIDOCAINE HYDROCHLORIDE 50 MG: 10 INJECTION, SOLUTION INFILTRATION; PERINEURAL at 09:19

## 2020-07-22 RX ADMIN — SUCCINYLCHOLINE CHLORIDE 120 MG: 20 INJECTION, SOLUTION INTRAMUSCULAR; INTRAVENOUS at 09:19

## 2020-07-22 RX ADMIN — Medication 30 MG: at 09:19

## 2020-07-22 RX ADMIN — ONDANSETRON HYDROCHLORIDE 4 MG: 2 INJECTION, SOLUTION INTRAMUSCULAR; INTRAVENOUS at 09:49

## 2020-07-22 RX ADMIN — EPHEDRINE SULFATE 10 MG: 50 INJECTION INTRAMUSCULAR; INTRAVENOUS; SUBCUTANEOUS at 09:38

## 2020-07-22 RX ADMIN — EPHEDRINE SULFATE 10 MG: 50 INJECTION INTRAMUSCULAR; INTRAVENOUS; SUBCUTANEOUS at 09:33

## 2020-07-22 RX ADMIN — PROPOFOL 200 MG: 10 INJECTION, EMULSION INTRAVENOUS at 09:19

## 2020-07-22 RX ADMIN — EPHEDRINE SULFATE 10 MG: 50 INJECTION INTRAMUSCULAR; INTRAVENOUS; SUBCUTANEOUS at 09:40

## 2020-07-22 RX ADMIN — DEXAMETHASONE SODIUM PHOSPHATE 10 MG: 10 INJECTION, SOLUTION INTRAMUSCULAR; INTRAVENOUS at 09:24

## 2020-07-22 RX ADMIN — MIDAZOLAM 2 MG: 1 INJECTION INTRAMUSCULAR; INTRAVENOUS at 09:15

## 2020-07-22 RX ADMIN — KETOROLAC TROMETHAMINE 10 MG: 30 INJECTION, SOLUTION INTRAMUSCULAR; INTRAVENOUS at 09:49

## 2020-07-22 RX ADMIN — HYDROMORPHONE HYDROCHLORIDE 0.25 MG: 1 INJECTION, SOLUTION INTRAMUSCULAR; INTRAVENOUS; SUBCUTANEOUS at 10:15

## 2020-07-22 RX ADMIN — EPHEDRINE SULFATE 10 MG: 50 INJECTION INTRAMUSCULAR; INTRAVENOUS; SUBCUTANEOUS at 09:35

## 2020-07-22 RX ADMIN — SODIUM CHLORIDE, SODIUM LACTATE, POTASSIUM CHLORIDE, AND CALCIUM CHLORIDE: 600; 310; 30; 20 INJECTION, SOLUTION INTRAVENOUS at 08:25

## 2020-07-22 RX ADMIN — LIDOCAINE HYDROCHLORIDE 4 ML: 40 SOLUTION TOPICAL at 09:19

## 2020-07-22 ASSESSMENT — ENCOUNTER SYMPTOMS: SHORTNESS OF BREATH: 0

## 2020-07-22 ASSESSMENT — PAIN SCALES - GENERAL
PAINLEVEL_OUTOF10: 2
PAINLEVEL_OUTOF10: 0
PAINLEVEL_OUTOF10: 5

## 2020-07-22 ASSESSMENT — LIFESTYLE VARIABLES: SMOKING_STATUS: 0

## 2020-07-22 NOTE — ANESTHESIA POSTPROCEDURE EVALUATION
Department of Anesthesiology  Postprocedure Note    Patient: Ephraim Isabel  MRN: [id number]  YOB: 1990  Date of evaluation: 7/22/2020  Time:  10:00 AM     Procedure Summary     Date:  07/22/20 Room / Location:  [address]    Anesthesia Start:  0915 Anesthesia Stop:  1000    Procedure:  SHARP EXCISIONAL DEBRIDEMENT OF ABDOMINAL WOUND (Right ) Diagnosis:  (RIGHT LOWER LEG WOUND)    Surgeon:  Noemy Bruce MD Responsible Provider:  IAN Montoya CRNA    Anesthesia Type:  general ASA Status:  3          Anesthesia Type: general    Josafat Phase I: Josafat Score: 10    Josafat Phase II:      Last vitals: Reviewed and per EMR flowsheets.        Anesthesia Post Evaluation    Patient location during evaluation: PACU  Patient participation: complete - patient participated  Level of consciousness: sleepy but conscious  Pain score: 0  Airway patency: patent  Nausea & Vomiting: no nausea and no vomiting  Complications: no  Cardiovascular status: hemodynamically stable  Respiratory status: acceptable and nasal cannula  Hydration status: euvolemic

## 2020-07-22 NOTE — H&P
07/14/20 1100   Change in Wound Size % (l*w) -495.24 07/14/20 1100   Wound Volume (cm^3) 6 cm^3 07/14/20 1100   Wound Healing % -173 07/14/20 1100   Post-Procedure Length (cm) 0.3 cm 07/08/20 1450   Post-Procedure Width (cm) 2.1 cm 07/08/20 1450   Post-Procedure Depth (cm) 3.5 cm 07/08/20 1450   Post-Procedure Surface Area (cm^2) 0.63 cm^2 07/08/20 1450   Post-Procedure Volume (cm^3) 2.2 cm^3 07/08/20 1450   Distance Tunneling (cm) 6.4 cm 07/14/20 1100   Tunneling Position ___ O'Clock 9 07/14/20 1100   Undermining Starts ___ O'Clock 0 07/14/20 1100   Undermining Ends___ O'Clock 0 07/14/20 1100   Undermining Maxium Distance (cm) 0 07/14/20 1100   Wound Assessment Slough;Drainage;Yellow 07/14/20 1100   Drainage Amount Copious 07/14/20 1100   Drainage Description Yellow 07/14/20 1100   Odor Mild 07/14/20 1100   Margins Unattached edges 07/14/20 1100   Exposed structure Fascia 07/14/20 1100   Pratima-wound Assessment Swelling;Red 07/14/20 1100   Non-staged Wound Description Full thickness 07/14/20 1100   [place]%Wound Bed 25 07/14/20 1100   Red%Wound Bed 0 07/14/20 1100   Yellow%Wound Bed 75 07/14/20 1100   Black%Wound Bed 0 07/14/20 1100   Purple%Wound Bed 0 07/14/20 1100   Other%Wound Bed 0 07/14/20 1100   Number of days: 5            Plan:            Plan for wound - Dress per physician order  Treatment:                Compression : No              Offloading : Yes              Dressing : see AVS              Additional Therapy : none            1. Discussed appropriate home care of this wound. Wound redressed. 2. Written patient dismissal instructions given to patient and signed by patient or POA. 3. Follow up: after surgery     The patient has quite a but of tunneling, especially to the patient's right. This would benefit from being debrided and opened surgically and then use a wound vac. The patient expressed understanding and would like to proceed with surgery.  I also discussed decreasing smoking and how nicotine affects wound healing.  Encouraged protein intake but not too many calories.     Electronically signed by Elías Thomas MD on 7/14/2020 at 11:19 AM

## 2020-07-22 NOTE — INTERVAL H&P NOTE
Update History & Physical    The patient's History and Physical of July 14, 2020 was reviewed with the patient and I examined the patient. There was no change. The surgical site was confirmed by the patient and me. Plan: The risks, benefits, expected outcome, and alternative to the recommended procedure have been discussed with the patient. Patient understands and wants to proceed with the procedure.      Electronically signed by Konrad Jain MD on 7/22/2020 at 9:01 AM

## 2020-07-22 NOTE — ANESTHESIA PRE PROCEDURE
Department of Anesthesiology  Preprocedure Note       Name:  Bev Precise   Age:  34 y.o.  :  1990                                          MRN:  [id number]         Date:  2020      Surgeon: Dex Hightower):  Horace Koo MD    Procedure: Procedure(s):  SHARP EXCISIONAL DEBRIDEMENT OF ABDOMINAL WOUND    Medications prior to admission:   Prior to Admission medications    Medication Sig Start Date End Date Taking? Authorizing Provider   ibuprofen (ADVIL;MOTRIN) 200 MG CAPS Take 1 capsule by mouth as needed for Fever   Yes Historical Provider, MD   ondansetron (ZOFRAN) 4 MG tablet Take 4 mg by mouth every 8 hours as needed   Yes Historical Provider, MD   cetirizine (ZYRTEC) 10 MG tablet Take 10 mg by mouth daily as needed for Allergies   Yes Historical Provider, MD   sodium hypochlorite (DAKINS) 0.125 % SOLN external solution Apply topically as directed twice daily. 20  Yes IAN Aguiar CNP   FLUoxetine (PROZAC) 10 MG capsule Take 10 mg by mouth daily Indications: Patient reports taking it at nighttime    Yes Historical Provider, MD       Current medications:    Current Facility-Administered Medications   Medication Dose Route Frequency Provider Last Rate Last Dose    lactated ringers infusion   Intravenous Continuous Horace Koo  mL/hr at 20 0825         Allergies:  No Known Allergies    Problem List:    Patient Active Problem List   Diagnosis Code    BMI 38.0-38.9,adult Z68.38    Snoring R06.83    Somnolence, daytime R40.0    Obstructive sleep apnea G47.33    CPAP (continuous positive airway pressure) dependence Z99.89    Abdominal wall skin ulcer, with fat layer exposed ([facility] L98.492    H/O:  Z98.891    Dehiscence of external surgical wound T81. 31XA    Tobacco abuse Z72.0       Past Medical History:        Diagnosis Date    Allergies     Anxiety     CPAP (continuous positive airway pressure) dependence     6cm to 20cm; as needed    Obstructive sleep Results   Component Value Date    PREGTESTUR Negative 07/22/2020        ABGs: No results found for: PHART, PO2ART, PUV3GWR, SSN1LSJ, BEART, N2KJJPYU     Type & Screen (If Applicable):  No results found for: LABABO, LABRH    Drug/Infectious Status (If Applicable):  No results found for: HIV, HEPCAB    COVID-19 Screening (If Applicable):   Lab Results   Component Value Date    COVID19 Not Detected 07/18/2020         Anesthesia Evaluation  Patient summary reviewed and Nursing notes reviewed no history of anesthetic complications:   Airway: Mallampati: II  TM distance: >3 FB   Neck ROM: full  Mouth opening: > = 3 FB Dental: normal exam         Pulmonary:Negative Pulmonary ROS and normal exam  breath sounds clear to auscultation  (+) sleep apnea:      (-) shortness of breath and not a current smoker          Patient did not smoke on day of surgery. Cardiovascular:        (-) hypertension, CAD,  angina and  CHF    NYHA Classification: I  ECG reviewed  Rhythm: regular  Rate: normal           Beta Blocker:  Not on Beta Blocker         Neuro/Psych:   Negative Neuro/Psych ROS  (+) depression/anxiety    (-) seizures and CVA           GI/Hepatic/Renal: Neg GI/Hepatic/Renal ROS  (+) morbid obesity     (-) hiatal hernia and GERD       Endo/Other: Negative Endo/Other ROS             Pt had PAT visit. Abdominal:       Abdomen: soft. Vascular:                                        Anesthesia Plan      general     ASA 3     (Iv zofran within 30 min of closing )  Induction: intravenous. BIS  MIPS: Postoperative opioids intended and Prophylactic antiemetics administered. Anesthetic plan and risks discussed with patient. Use of blood products discussed with patient whom. Plan discussed with CRNA.     Attending anesthesiologist reviewed and agrees with Pre Eval content              Victor Manuel Contreras MD   7/22/2020

## 2020-07-22 NOTE — H&P (VIEW-ONLY)
[address]  Progress Note and Procedure Note        Debbi Avelar  AGE: 34 y.o. GENDER: female  : 1990  TODAY'S DATE:  2020     Subjective:      CHIEF COMPLAINT abdominal wound     HISTORY of PRESENT ILLNESS HPI   Debbi Avelar is a 34 y.o. female who presents today for wound/ulcer evaluation. Ulcer Type:non-healing surgical  Ulcer/Wound Location: section  Contributing factors:chronic pressure, shear force and obesity          Patient Active Problem List   Diagnosis Code    BMI 38.0-38.9,adult Z68.38    Snoring R06.83    Somnolence, daytime R40.0    Obstructive sleep apnea G47.33    CPAP (continuous positive airway pressure) dependence Z99.89    Abdominal wall skin ulcer, with fat layer exposed ([address] L98.492    H/O:  Z98.891    Dehiscence of external surgical wound T81. 31XA    Tobacco abuse Z72.0        ALLERGIES     No Known Allergies           Objective:       Pulse 90   Temp 97.4 °F (36.3 °C) (Temporal)   Resp 16   Ht 5' 9\" (1.753 m)   Wt 232 lb (105.2 kg)   BMI 34.26 kg/m²            Assessment:            Problem List Items Addressed This Visit           Abdominal wall skin ulcer, with fat layer exposed ([address]      H/O:       Dehiscence of external surgical wound      Tobacco abuse             The patients pain isPain Level: 0  . Wound(s) is unchanged. Please refer to nursing measurements and assessment regarding wound pre and post debridement.        Wound 20 Abdomen Mid;Lower Wound 1 Non Healing Surgical, Mid Lower abdomen  (Active)   Wound Image   20 1100   Wound Non-Healing Surgical 20 1100   Dressing Status Old drainage 20 1100   Dressing Changed Changed/New 20 1450   Wound Cleansed Rinsed/Irrigated with saline 20 1100   Wound Length (cm) 2.5 cm 20 1100   Wound Width (cm) 1.5 cm 20 1100   Wound Depth (cm) 1.6 cm 20 1100   Wound Surface Area (cm^2) 3.75 cm^2 07/14/20 1100   Change in Wound Size % (l*w) -495.24 07/14/20 1100   Wound Volume (cm^3) 6 cm^3 07/14/20 1100   Wound Healing % -173 07/14/20 1100   Post-Procedure Length (cm) 0.3 cm 07/08/20 1450   Post-Procedure Width (cm) 2.1 cm 07/08/20 1450   Post-Procedure Depth (cm) 3.5 cm 07/08/20 1450   Post-Procedure Surface Area (cm^2) 0.63 cm^2 07/08/20 1450   Post-Procedure Volume (cm^3) 2.2 cm^3 07/08/20 1450   Distance Tunneling (cm) 6.4 cm 07/14/20 1100   Tunneling Position ___ O'Clock 9 07/14/20 1100   Undermining Starts ___ O'Clock 0 07/14/20 1100   Undermining Ends___ O'Clock 0 07/14/20 1100   Undermining Maxium Distance (cm) 0 07/14/20 1100   Wound Assessment Slough;Drainage;Yellow 07/14/20 1100   Drainage Amount Copious 07/14/20 1100   Drainage Description Yellow 07/14/20 1100   Odor Mild 07/14/20 1100   Margins Unattached edges 07/14/20 1100   Exposed structure Fascia 07/14/20 1100   Pratima-wound Assessment Swelling;Red 07/14/20 1100   Non-staged Wound Description Full thickness 07/14/20 1100   [place]%Wound Bed 25 07/14/20 1100   Red%Wound Bed 0 07/14/20 1100   Yellow%Wound Bed 75 07/14/20 1100   Black%Wound Bed 0 07/14/20 1100   Purple%Wound Bed 0 07/14/20 1100   Other%Wound Bed 0 07/14/20 1100   Number of days: 5            Plan:            Plan for wound - Dress per physician order  Treatment:                Compression : No              Offloading : Yes              Dressing : see AVS              Additional Therapy : none            1. Discussed appropriate home care of this wound. Wound redressed. 2. Written patient dismissal instructions given to patient and signed by patient or POA. 3. Follow up: after surgery     The patient has quite a but of tunneling, especially to the patient's right. This would benefit from being debrided and opened surgically and then use a wound vac. The patient expressed understanding and would like to proceed with surgery.  I also discussed decreasing smoking and how nicotine affects wound healing.  Encouraged protein intake but not too many calories.     Electronically signed by Patricia Sen MD on 7/14/2020 at 11:19 AM

## 2020-07-27 ENCOUNTER — HOSPITAL ENCOUNTER (OUTPATIENT)
Dept: WOUND CARE | Age: 30
Discharge: HOME | End: 2020-07-27
Payer: MEDICAID

## 2020-07-28 NOTE — OP NOTE
Operative Report    PATIENT NAME: [address] RECORD NO. [id number]  SURGEON: Susan Crocker MD   Primary Care Physician: Jahaira Shi, APRN - NP  Date: 2020     PROCEDURE PERFORMED: sharp excisional debridement of abdominal wound through skin and soft tissue 9 by 2.5 cm and 2 cm deep  PREOPERATIVE DIAGNOSIS: abdominal wound  POSTOPERATIVE DIAGNOSIS: Same  SURGEON:  Susan Crocker MD   ANESTHESIA:  general  ESTIMATED BLOOD LOSS: minimal  SPECIMEN:  none  COMPLICATIONS:  None; patient tolerated the procedure well. FINDINGS: small opening along  incision, with tunneling  DISPOSITION: PACU - hemodynamically stable. CONDITION: stable      Indications: the patient is a 34year old female who previously underwent  section. Her incision did not heal, and she developed a tunnel. She presents at this time for sharp excisional debridement. Procedure Details     After the risks and benefits of the procedure were explained, informed consent was obtained, and the patient was taken to the operating room. The patient was placed in supine position and anesthesia was begun. The abdomen was prepped and draped in normal sterile fashion. A time out was performed. The wound was probed with a hemostat and found to tunnel. The incision scar was opened along its length and the edges excised with a scalpel, through skin and soft tissue, removing all the devitalized tissue. The base was debrided with a curette. Hemostasis was achieved using cautery. The measurements were as above. The wound was gently packed with saline dampened guaze, and covered with dry guaze, abd and tape. The patient tolerated the procedure well, was removed from anesthesia, and transferred to the recovery area in stable condition.                   Susan Crocker MD   Electronically signed 20 5:08 PM

## 2020-07-31 ENCOUNTER — HOSPITAL ENCOUNTER (OUTPATIENT)
Dept: WOUND CARE | Age: 30
Discharge: HOME | End: 2020-07-31
Payer: MEDICAID

## 2020-07-31 VITALS
TEMPERATURE: 97.3 F | DIASTOLIC BLOOD PRESSURE: 80 MMHG | RESPIRATION RATE: 20 BRPM | BODY MASS INDEX: 39.1 KG/M2 | HEART RATE: 77 BPM | SYSTOLIC BLOOD PRESSURE: 122 MMHG | HEIGHT: 69 IN | WEIGHT: 264 LBS

## 2020-07-31 PROCEDURE — 99024 POSTOP FOLLOW-UP VISIT: CPT | Performed by: NURSE PRACTITIONER

## 2020-07-31 PROCEDURE — 97597 DBRDMT OPN WND 1ST 20 CM/<: CPT

## 2020-07-31 PROCEDURE — 97597 DBRDMT OPN WND 1ST 20 CM/<: CPT | Performed by: NURSE PRACTITIONER

## 2020-07-31 RX ORDER — HYDROCODONE BITARTRATE AND ACETAMINOPHEN 5; 325 MG/1; MG/1
1 TABLET ORAL EVERY 6 HOURS PRN
COMMUNITY
End: 2020-08-24 | Stop reason: ALTCHOICE

## 2020-07-31 RX ORDER — LIDOCAINE HYDROCHLORIDE 40 MG/ML
SOLUTION TOPICAL ONCE
Status: CANCELLED | OUTPATIENT
Start: 2020-07-31

## 2020-07-31 ASSESSMENT — PAIN SCALES - GENERAL: PAINLEVEL_OUTOF10: 0

## 2020-07-31 NOTE — PROGRESS NOTES
[address]   Progress Note and Procedure Note      [address] RECORD NUMBER:  [id number]  AGE: 34 y.o. GENDER: female  : 1990  EPISODE DATE:  2020    Subjective:     Chief Complaint   Patient presents with    Wound Check     Abdomen wound; patient denies increased pain at wound site       20- Dr. Zoë Griggs of PRESENT ILLNESS HPI     Beka Keyes is a 34 y.o. female who presents today for wound/ulcer evaluation. History of Wound Context: abdominal wall post surgical wound follow up    Ulcer Identification:  Ulcer Type: non-healing surgical  Contributing Factors: obesity, smoking, malnutrition and non-adherence  PAST MEDICAL HISTORY        Diagnosis Date    Allergies     Anxiety     CPAP (continuous positive airway pressure) dependence     6cm to 20cm; as needed    Obstructive sleep apnea     AHI: 8.3 per HST, 2018    Open wound of pubic region without complication     pt states s/p csection       PAST SURGICAL HISTORY    Past Surgical History:   Procedure Laterality Date    CARPAL TUNNEL RELEASE Bilateral      SECTION  06/10/2020    CYST REMOVAL Left     PLEURAL CATHETER INSERTION Right 2020    SHARP EXCISIONAL DEBRIDEMENT OF ABDOMINAL WOUND performed by John Hya MD at [address]    History reviewed. No pertinent family history.     SOCIAL HISTORY    Social History     Tobacco Use    Smoking status: Current Every Day Smoker     Packs/day: 0.50    Smokeless tobacco: Never Used    Tobacco comment: or less   Substance Use Topics    Alcohol use: Yes     Comment: occassional wine cooler    Drug use: No       ALLERGIES    No Known Allergies    MEDICATIONS    Current Outpatient Medications on File Prior to Encounter   Medication Sig Dispense Refill    HYDROcodone-acetaminophen (NORCO) 5-325 MG per tablet Take 1 tablet by mouth every 6 hours as needed for Pain.  ibuprofen (ADVIL;MOTRIN) 200 MG CAPS Take 1 capsule by mouth as needed for Fever      ondansetron (ZOFRAN) 4 MG tablet Take 4 mg by mouth every 8 hours as needed      cetirizine (ZYRTEC) 10 MG tablet Take 10 mg by mouth daily as needed for Allergies      sodium hypochlorite (DAKINS) 0.125 % SOLN external solution Apply topically as directed twice daily. 1 Bottle 1    FLUoxetine (PROZAC) 10 MG capsule Take 10 mg by mouth daily Indications: Patient reports taking it at nighttime        No current facility-administered medications on file prior to encounter. REVIEW OF SYSTEMS    A comprehensive review of systems was negative.     Objective:      /80   Pulse 77   Temp 97.3 °F (36.3 °C) (Temporal)   Resp 20   Ht 5' 9\" (1.753 m)   Wt 264 lb (119.7 kg)   LMP 07/15/2020   BMI 38.99 kg/m²     Wt Readings from Last 3 Encounters:   07/31/20 264 lb (119.7 kg)   07/22/20 264 lb (119.7 kg)   07/17/20 234 lb (106.1 kg)       PHYSICAL EXAM    General Appearance: alert and oriented to person, place and time, well developed and well- nourished, in no acute distress  Skin: warm and dry, no rash or erythema  Head: normocephalic and atraumatic  Eyes: pupils equal, round, and reactive to light, extraocular eye movements intact, conjunctivae normal  ENT: tympanic membrane, external ear and ear canal normal bilaterally, nose without deformity, nasal mucosa and turbinates normal without polyps  Neck: supple and non-tender without mass, no thyromegaly or thyroid nodules, no cervical lymphadenopathy  Pulmonary/Chest: clear to auscultation bilaterally- no wheezes, rales or rhonchi, normal air movement, no respiratory distress  Extremities: no cyanosis, clubbing or edema  Musculoskeletal: normal range of motion, no joint swelling, deformity or tenderness  Neurologic: reflexes normal and symmetric, no cranial nerve deficit, gait, coordination and speech normal      Assessment:      Patient Active Wound Image    07/31/20 0843   Wound Non-Healing Surgical 07/31/20 0843   Offloading for Diabetic Foot Ulcers No offloading required 07/31/20 0843   Dressing Status Old drainage; Intact 07/31/20 0843   Dressing Changed Changed/New 07/31/20 0843   Dressing/Treatment Vacuum dressing 07/31/20 0843   Wound Cleansed Rinsed/Irrigated with saline 07/31/20 0843   Wound Length (cm) 2 cm 07/31/20 0843   Wound Width (cm) 8 cm 07/31/20 0843   Wound Depth (cm) 1.8 cm 07/31/20 0843   Wound Surface Area (cm^2) 16 cm^2 07/31/20 0843   Change in Wound Size % (l*w) -2439.68 07/31/20 0843   Wound Volume (cm^3) 28.8 cm^3 07/31/20 0843   Wound Healing % -1209 07/31/20 0843   Post-Procedure Length (cm) 2 cm 07/31/20 0843   Post-Procedure Width (cm) 8 cm 07/31/20 0843   Post-Procedure Depth (cm) 1.8 cm 07/31/20 0843   Post-Procedure Surface Area (cm^2) 16 cm^2 07/31/20 0843   Post-Procedure Volume (cm^3) 28.8 cm^3 07/31/20 0843   Distance Tunneling (cm) 6.4 cm 07/14/20 1100   Tunneling Position ___ O'Clock 9 07/14/20 1100   Undermining Starts ___ O'Clock 0 07/14/20 1100   Undermining Ends___ O'Clock 0 07/14/20 1100   Undermining Maxium Distance (cm) 0 07/14/20 1100   Wound Assessment Pink;Red;Slough; Yellow 07/31/20 0843   Drainage Amount Moderate 07/31/20 0843   Drainage Description Serosanguinous 07/31/20 0843   Odor None 07/31/20 0843   Margins Attached edges 07/31/20 0843   Exposed structure Fascia 07/31/20 0843   Pratima-wound Assessment Dry; Intact 07/31/20 0843   Non-staged Wound Description Full thickness 07/31/20 0843   [place]%Wound Bed 35 07/31/20 0843   Red%Wound Bed 35 07/31/20 0843   Yellow%Wound Bed 30 07/31/20 0843   Black%Wound Bed 0 07/14/20 1100   Purple%Wound Bed 0 07/14/20 1100   Other%Wound Bed 0 07/14/20 1100   Number of days: 22            Percent of Wound/Ulcer Debrided: 100%    Total Surface Area Debrided:  16 sq cm       Diabetic/Pressure/Non Pressure Ulcers only:  Ulcer: N/A      Estimated Blood Loss: Minimal    Hemostasis Achieved:  by pressure    Procedural Pain:  8  / 10     Post Procedural Pain:  3 / 10     Response to treatment:  Well tolerated by patient., With complaints of pain. Plan:     Problem List Items Addressed This Visit     BMI 38.0-38.9,adult    Abdominal wall skin ulcer, with fat layer exposed ([address] - Primary    H/O:     Dehiscence of external surgical wound    Tobacco abuse          Treatment Note please see attached Discharge Instructions    In my professional opinion this patient would benefit from HBO Therapy: No    Written patient dismissal instructions given to patient and signed by patient or POA. Ms. Jyoti Montanez is been non-compliant with dressing instructions and not using her wound vac. She states she is seperating from her  therefore she is between houses and unable to have home health at this time. I do not think she realizes the severity of her wound. I had a detailed discussion about the need for compliance and appreciation for the care she has been given. I hope she is able to get a home established and take her wound seriously. She needs home health to aid in this wound due to the fact that she has had trouble getting her wound vac to stay sealed. She will see Dr. Nasir Zendejas next week.     Electronically signed by IAN Quezada CNP on 2020 at 9:59 AM

## 2020-07-31 NOTE — PROGRESS NOTES
Negative Pressure    NAME:  Peter Quesada  YOB: 1990  MEDICAL RECORD NUMBER:  [id number]  DATE:  7/31/2020     Applied Negative Pressure to Abdomen wound(s)/ulcer(s).  [x] Applied skin barrier prep to elsie-wound.  [x] Cut strips of plastic drape to picture frame wound so that elsie-wound is     covered with the drape.  [x] If bridging dressing to less prominent site, cover any intact skin that will come in contact with the Negative Pressure Therapy sponge, gauze or channel drain with plastic drape. The sponge should never touch intact skin.  [x] Cut sponge, gauze or channel drain to size which will fit into the wound/ulcer bed without being forced.  [x] Be sure the sponge is large enough to hold the entire round plastic flange which is attached to the tubing. Never allow flange to be larger than the sponge or it will produce suction damaging intact skin.  Total number of individual pieces of foam used within the wound bed: 1     [x] If bridging the dressing away from the primary site, be sure the bridge leads to a piece of sponge large enough to hold the entire flange without allowing any of the flange to overlap onto intact skin.  [x] Covered sponge, gauze or channel drain with plastic drape.  [x] Cut a hole in this plastic drape directly over the sponge the same size as the plastic drain tubing.  [x] Removed plastic liner from flange and apply it directly over the hole you cut.  [x] Removed the plastic cover from the flange.  [x] Attached the tubing to the wound/ulcer Negative Pressure Therapy and turn it on to be sure a vacuum is created and that there are no leaks.  [x] If air leaks occur, use plastic drape to patch them.  [x] Secured Negative Pressure Therapy dressing with ace wrap loosely if located on an extremity. Maintain tubing outside of ace wrap. Tubing must not exert pressure on intact skin.     Applied per [facility] Guidelines      Electronically signed by Fermín Venegas RN on 7/31/2020 at 12:07 PM

## 2020-08-04 ENCOUNTER — HOSPITAL ENCOUNTER (OUTPATIENT)
Dept: WOUND CARE | Age: 30
Discharge: HOME | End: 2020-08-04
Payer: MEDICAID

## 2020-08-04 VITALS
DIASTOLIC BLOOD PRESSURE: 88 MMHG | WEIGHT: 264 LBS | HEART RATE: 85 BPM | TEMPERATURE: 97 F | RESPIRATION RATE: 16 BRPM | HEIGHT: 69 IN | BODY MASS INDEX: 39.1 KG/M2 | SYSTOLIC BLOOD PRESSURE: 130 MMHG

## 2020-08-04 PROCEDURE — 11042 DBRDMT SUBQ TIS 1ST 20SQCM/<: CPT | Performed by: SURGERY

## 2020-08-04 PROCEDURE — 11042 DBRDMT SUBQ TIS 1ST 20SQCM/<: CPT

## 2020-08-04 RX ORDER — LIDOCAINE HYDROCHLORIDE 40 MG/ML
SOLUTION TOPICAL ONCE
Status: CANCELLED | OUTPATIENT
Start: 2020-08-04

## 2020-08-04 ASSESSMENT — PAIN SCALES - GENERAL: PAINLEVEL_OUTOF10: 0

## 2020-08-04 NOTE — PROGRESS NOTES
3217   Wound Assessment Pink;Red;Slough; Yellow 08/04/20 0957   Pratima-wound Assessment Dry; Intact 08/04/20 0957   Shape oval 07/31/20 0843   Odor None 08/04/20 0957   Number of days: 13       Wound 07/08/20 Abdomen Mid;Lower Wound 1 Non Healing Surgical, Mid Lower abdomen  (Active)   Wound Image   08/04/20 0957   Wound Non-Healing Surgical 08/04/20 0957   Offloading for Diabetic Foot Ulcers No offloading required 07/31/20 0843   Dressing Status Old drainage 08/04/20 0957   Dressing Changed Changed/New 07/31/20 0843   Dressing/Treatment Vacuum dressing 07/31/20 0843   Wound Cleansed Rinsed/Irrigated with saline 08/04/20 0957   Wound Length (cm) 1.8 cm 08/04/20 0957   Wound Width (cm) 8 cm 08/04/20 0957   Wound Depth (cm) 1.2 cm 08/04/20 0957   Wound Surface Area (cm^2) 14.4 cm^2 08/04/20 0957   Change in Wound Size % (l*w) -2185.71 08/04/20 0957   Wound Volume (cm^3) 17.28 cm^3 08/04/20 0957   Wound Healing % -685 08/04/20 0957   Post-Procedure Length (cm) 1.8 cm 08/04/20 1030   Post-Procedure Width (cm) 8 cm 08/04/20 1030   Post-Procedure Depth (cm) 1.2 cm 08/04/20 1030   Post-Procedure Surface Area (cm^2) 14.4 cm^2 08/04/20 1030   Post-Procedure Volume (cm^3) 17.28 cm^3 08/04/20 1030   Distance Tunneling (cm) 6.4 cm 07/14/20 1100   Tunneling Position ___ O'Clock 9 07/14/20 1100   Undermining Starts ___ O'Clock 0 07/14/20 1100   Undermining Ends___ O'Clock 0 07/14/20 1100   Undermining Maxium Distance (cm) 0 07/14/20 1100   Wound Assessment Red 08/04/20 0957   Drainage Amount Moderate 08/04/20 0957   Drainage Description Serosanguinous 08/04/20 0957   Odor None 08/04/20 0957   Margins Attached edges 08/04/20 0957   Exposed structure Fascia 08/04/20 0957   Pratima-wound Assessment Dry; Intact 08/04/20 0957   Non-staged Wound Description Full thickness 08/04/20 0957   [place]%Wound Bed 75 08/04/20 0957   Red%Wound Bed 15 08/04/20 0957   Yellow%Wound Bed 10 08/04/20 0957   Black%Wound Bed 0 08/04/20 0957   Purple%Wound Bed 0 08/04/20 0957   Other%Wound Bed 0 08/04/20 0957   Number of days: 26           Procedure Note  Indications:  Based on my examination of this patient's wound(s)/ulcer(s) today, debridement is required to promote healing and evaluate the wound base. Performed by: Susan Crocker MD    Consent obtained:  Yes    Time out taken:  Yes    Pain Control: Anesthetic  Anesthetic: 2% Lidocaine Gel Topical       Debridement: Excisional Debridement    Using curette the wound(s)/ulcer(s) was/were sharply debrided down through and including the removal of epidermis, dermis and subcutaneous tissue. Devitalized Tissue Debrided:  fibrin, biofilm and slough    Pre Debridement Measurements:  Are located in the Wound/Ulcer Documentation Flow Sheet    Wound/Ulcer #: 1    Post Debridement Measurements:  Wound/Ulcer Descriptions are Pre Debridement except measurements:          Percent of Wound(s)/Ulcer(s) Debrided: 100%    Total Surface Area Debrided:  14.4 sq cm     Diabetic/Pressure/Non Pressure Ulcers only:  Ulcer: N/A     Estimated Blood Loss:  Minimal    Hemostasis Achieved:  not needed    Response to treatment:  Well tolerated by patient. Plan:          Plan for wound - Dress per physician order  Treatment:     Compression : No   Offloading : Yes   Dressing : see AVS   Additional Therapy : none     1. Discussed appropriate home care of this wound. Wound redressed. 2. Written patient dismissal instructions given to patient and signed by patient or POA. 3. Follow up: 2 week(s). Continue with wound vac, having changes at clinic. Follow up in 2 weeks.      Electronically signed by Susan Crocker MD on 8/4/2020 at 10:56 AM

## 2020-08-04 NOTE — PLAN OF CARE
Negative Pressure    NAME:  Darlene Villanueva  YOB: 1990  MEDICAL RECORD NUMBER:  [id number]  DATE:  8/4/2020     Applied Negative Pressure to abdomen wound(s)/ulcer(s).  [x] Applied skin barrier prep to elsie-wound.  [x] Cut strips of plastic drape to picture frame wound so that elsie-wound is     covered with the drape.  [x] If bridging dressing to less prominent site, cover any intact skin that will come in contact with the Negative Pressure Therapy sponge, gauze or channel drain with plastic drape. The sponge should never touch intact skin.  [x] Cut sponge, gauze or channel drain to size which will fit into the wound/ulcer bed without being forced.  [x] Be sure the sponge is large enough to hold the entire round plastic flange which is attached to the tubing. Never allow flange to be larger than the sponge or it will produce suction damaging intact skin.  Total number of individual pieces of foam used within the wound bed: 1     [x] If bridging the dressing away from the primary site, be sure the bridge leads to a piece of sponge large enough to hold the entire flange without allowing any of the flange to overlap onto intact skin.  [x] Covered sponge, gauze or channel drain with plastic drape.  [x] Cut a hole in this plastic drape directly over the sponge the same size as the plastic drain tubing.  [x] Removed plastic liner from flange and apply it directly over the hole you cut.  [x] Removed the plastic cover from the flange.  [x] Attached the tubing to the wound/ulcer Negative Pressure Therapy and turn it on to be sure a vacuum is created and that there are no leaks.  [x] If air leaks occur, use plastic drape to patch them.  [x] Secured Negative Pressure Therapy dressing with ace wrap loosely if located on an extremity. Maintain tubing outside of ace wrap. Tubing must not exert pressure on intact skin.     Applied per  Guidelines      Electronically signed by Robyn Ordoñez RN on 8/4/2020 at 11:51 AM

## 2020-08-06 ENCOUNTER — HOSPITAL ENCOUNTER (OUTPATIENT)
Dept: WOUND CARE | Age: 30
Discharge: HOME | End: 2020-08-06
Payer: MEDICAID

## 2020-08-06 VITALS
TEMPERATURE: 98 F | DIASTOLIC BLOOD PRESSURE: 83 MMHG | HEIGHT: 69 IN | HEART RATE: 69 BPM | BODY MASS INDEX: 39.1 KG/M2 | WEIGHT: 264 LBS | SYSTOLIC BLOOD PRESSURE: 118 MMHG | RESPIRATION RATE: 14 BRPM

## 2020-08-06 PROCEDURE — 97605 NEG PRS WND THER DME<=50SQCM: CPT

## 2020-08-06 ASSESSMENT — PAIN SCALES - GENERAL: PAINLEVEL_OUTOF10: 0

## 2020-08-06 NOTE — PLAN OF CARE
Negative Pressure    NAME:  Malka Gomez  YOB: 1990  MEDICAL RECORD NUMBER:  [id number]  DATE:  8/6/2020     Applied Negative Pressure to abdomen wound(s)/ulcer(s).  [x] Applied skin barrier prep to elsie-wound.  [x] Cut strips of plastic drape to picture frame wound so that elsie-wound is     covered with the drape.  [x] If bridging dressing to less prominent site, cover any intact skin that will come in contact with the Negative Pressure Therapy sponge, gauze or channel drain with plastic drape. The sponge should never touch intact skin.  [x] Cut sponge, gauze or channel drain to size which will fit into the wound/ulcer bed without being forced.  [x] Be sure the sponge is large enough to hold the entire round plastic flange which is attached to the tubing. Never allow flange to be larger than the sponge or it will produce suction damaging intact skin.  Total number of individual pieces of foam used within the wound bed: 1     [x] If bridging the dressing away from the primary site, be sure the bridge leads to a piece of sponge large enough to hold the entire flange without allowing any of the flange to overlap onto intact skin.  [x] Covered sponge, gauze or channel drain with plastic drape.  [x] Cut a hole in this plastic drape directly over the sponge the same size as the plastic drain tubing.  [x] Removed plastic liner from flange and apply it directly over the hole you cut.  [x] Removed the plastic cover from the flange.  [x] Attached the tubing to the wound/ulcer Negative Pressure Therapy and turn it on to be sure a vacuum is created and that there are no leaks.  [x] If air leaks occur, use plastic drape to patch them.  [x] Secured Negative Pressure Therapy dressing with ace wrap loosely if located on an extremity. Maintain tubing outside of ace wrap. Tubing must not exert pressure on intact skin.     Applied per  Guidelines      Electronically signed by Berkley Santoyo RN on 8/6/2020 at 11:00 AM

## 2020-08-07 ENCOUNTER — HOSPITAL ENCOUNTER (OUTPATIENT)
Dept: WOUND CARE | Age: 30
Discharge: HOME | End: 2020-08-07
Payer: MEDICAID

## 2020-08-07 VITALS
HEIGHT: 69 IN | HEART RATE: 68 BPM | BODY MASS INDEX: 34.66 KG/M2 | WEIGHT: 234 LBS | DIASTOLIC BLOOD PRESSURE: 76 MMHG | SYSTOLIC BLOOD PRESSURE: 111 MMHG

## 2020-08-07 PROCEDURE — 97605 NEG PRS WND THER DME<=50SQCM: CPT

## 2020-08-07 RX ORDER — LIDOCAINE HYDROCHLORIDE 40 MG/ML
SOLUTION TOPICAL ONCE
Status: CANCELLED | OUTPATIENT
Start: 2020-08-07

## 2020-08-07 ASSESSMENT — PAIN SCALES - GENERAL: PAINLEVEL_OUTOF10: 0

## 2020-08-10 ENCOUNTER — HOSPITAL ENCOUNTER (OUTPATIENT)
Dept: WOUND CARE | Age: 30
Discharge: HOME | End: 2020-08-10

## 2020-08-12 ENCOUNTER — HOSPITAL ENCOUNTER (OUTPATIENT)
Dept: WOUND CARE | Age: 30
Discharge: HOME | End: 2020-08-12
Payer: MEDICAID

## 2020-08-12 VITALS
WEIGHT: 234 LBS | HEIGHT: 69 IN | DIASTOLIC BLOOD PRESSURE: 73 MMHG | SYSTOLIC BLOOD PRESSURE: 108 MMHG | HEART RATE: 67 BPM | BODY MASS INDEX: 34.66 KG/M2 | TEMPERATURE: 98.7 F | RESPIRATION RATE: 16 BRPM

## 2020-08-12 PROCEDURE — 97605 NEG PRS WND THER DME<=50SQCM: CPT

## 2020-08-12 ASSESSMENT — PAIN SCALES - GENERAL: PAINLEVEL_OUTOF10: 0

## 2020-08-12 NOTE — PLAN OF CARE
Negative Pressure    NAME:  Paola Bal  YOB: 1990  MEDICAL RECORD NUMBER:  [id number]  DATE:  8/12/2020     Applied Negative Pressure to lower abdomen wound(s)/ulcer(s).  [x] Applied skin barrier prep to elsie-wound.  [x] Cut strips of plastic drape to picture frame wound so that elsie-wound is     covered with the drape.  [x] If bridging dressing to less prominent site, cover any intact skin that will come in contact with the Negative Pressure Therapy sponge, gauze or channel drain with plastic drape. The sponge should never touch intact skin.  [x] Cut sponge, gauze or channel drain to size which will fit into the wound/ulcer bed without being forced.  [x] Be sure the sponge is large enough to hold the entire round plastic flange which is attached to the tubing. Never allow flange to be larger than the sponge or it will produce suction damaging intact skin.  Total number of individual pieces of foam used within the wound bed: 1     [x] If bridging the dressing away from the primary site, be sure the bridge leads to a piece of sponge large enough to hold the entire flange without allowing any of the flange to overlap onto intact skin.  [x] Covered sponge, gauze or channel drain with plastic drape.  [x] Cut a hole in this plastic drape directly over the sponge the same size as the plastic drain tubing.  [x] Removed plastic liner from flange and apply it directly over the hole you cut.  [x] Removed the plastic cover from the flange.  [x] Attached the tubing to the wound/ulcer Negative Pressure Therapy and turn it on to be sure a vacuum is created and that there are no leaks.  [x] If air leaks occur, use plastic drape to patch them.  [] Secured Negative Pressure Therapy dressing with ace wrap loosely if located on an extremity. Maintain tubing outside of ace wrap. Tubing must not exert pressure on intact skin.     Applied per [facility] Guidelines      Electronically signed by Bridget Holbrook RN on 8/12/2020 at 12:07 PM

## 2020-08-14 ENCOUNTER — HOSPITAL ENCOUNTER (OUTPATIENT)
Dept: WOUND CARE | Age: 30
Discharge: HOME | End: 2020-08-14
Payer: MEDICAID

## 2020-08-14 VITALS
DIASTOLIC BLOOD PRESSURE: 62 MMHG | HEART RATE: 61 BPM | BODY MASS INDEX: 33.33 KG/M2 | HEIGHT: 69 IN | WEIGHT: 225 LBS | RESPIRATION RATE: 16 BRPM | SYSTOLIC BLOOD PRESSURE: 104 MMHG

## 2020-08-14 PROCEDURE — 97605 NEG PRS WND THER DME<=50SQCM: CPT

## 2020-08-14 ASSESSMENT — PAIN SCALES - GENERAL: PAINLEVEL_OUTOF10: 0

## 2020-08-14 NOTE — PLAN OF CARE
Negative Pressure    NAME:  Yeimi Record  YOB: 1990  MEDICAL RECORD NUMBER:  [id number]  DATE:  8/14/2020     Applied Negative Pressure to Low abdomen wound(s)/ulcer(s).  [x] Applied skin barrier prep to elsie-wound.  [x] Cut strips of plastic drape to picture frame wound so that elsie-wound is     covered with the drape.  [x] If bridging dressing to less prominent site, cover any intact skin that will come in contact with the Negative Pressure Therapy sponge, gauze or channel drain with plastic drape. The sponge should never touch intact skin.  [x] Cut sponge, gauze or channel drain to size which will fit into the wound/ulcer bed without being forced.  [x] Be sure the sponge is large enough to hold the entire round plastic flange which is attached to the tubing. Never allow flange to be larger than the sponge or it will produce suction damaging intact skin.  Total number of individual pieces of foam used within the wound bed: 2     [x] If bridging the dressing away from the primary site, be sure the bridge leads to a piece of sponge large enough to hold the entire flange without allowing any of the flange to overlap onto intact skin.  [x] Covered sponge, gauze or channel drain with plastic drape.  [x] Cut a hole in this plastic drape directly over the sponge the same size as the plastic drain tubing.  [x] Removed plastic liner from flange and apply it directly over the hole you cut.  [x] Removed the plastic cover from the flange.  [x] Attached the tubing to the wound/ulcer Negative Pressure Therapy and turn it on to be sure a vacuum is created and that there are no leaks.  [x] If air leaks occur, use plastic drape to patch them.  [] Secured Negative Pressure Therapy dressing with ace wrap loosely if located on an extremity. Maintain tubing outside of ace wrap. Tubing must not exert pressure on intact skin.     Applied per [facility] Guidelines      Electronically signed by Noberto Nissen, RN on 8/14/2020 at 10:11 AM

## 2020-08-18 ENCOUNTER — HOSPITAL ENCOUNTER (OUTPATIENT)
Dept: WOUND CARE | Age: 30
Discharge: HOME | End: 2020-08-18
Payer: MEDICAID

## 2020-08-18 VITALS
TEMPERATURE: 98 F | BODY MASS INDEX: 33.33 KG/M2 | RESPIRATION RATE: 18 BRPM | HEIGHT: 69 IN | DIASTOLIC BLOOD PRESSURE: 69 MMHG | SYSTOLIC BLOOD PRESSURE: 102 MMHG | WEIGHT: 225 LBS | HEART RATE: 79 BPM

## 2020-08-18 PROCEDURE — 11042 DBRDMT SUBQ TIS 1ST 20SQCM/<: CPT

## 2020-08-18 PROCEDURE — 11042 DBRDMT SUBQ TIS 1ST 20SQCM/<: CPT | Performed by: SURGERY

## 2020-08-18 RX ORDER — LIDOCAINE HYDROCHLORIDE 40 MG/ML
SOLUTION TOPICAL ONCE
Status: CANCELLED | OUTPATIENT
Start: 2020-08-18

## 2020-08-18 ASSESSMENT — PAIN SCALES - GENERAL: PAINLEVEL_OUTOF10: 0

## 2020-08-18 NOTE — PROGRESS NOTES
[facility] [address]  Progress Note and Procedure Note      Carolyn Quinones  AGE: 34 y.o. GENDER: female  : 1990  TODAY'S DATE:  2020    Subjective:     CHIEF COMPLAINT abdominal wound     HISTORY of PRESENT ILLNESS HPI   Carolyn Quinones is a 34 y.o. female who presents today for wound/ulcer evaluation. Ulcer Type:non-healing surgical  Ulcer/Wound Location:lower abdominal  section  Contributing factors:shear force, obesity and smoking    Patient Active Problem List   Diagnosis Code    BMI 38.0-38.9,adult Z68.38    Snoring R06.83    Somnolence, daytime R40.0    Obstructive sleep apnea G47.33    CPAP (continuous positive airway pressure) dependence Z99.89    Abdominal wall skin ulcer, with fat layer exposed ([address] L98.492    H/O:  Z98.891    Dehiscence of external surgical wound T81. 31XA    Tobacco abuse Z72.0       ALLERGIES    No Known Allergies        Objective:      /69   Pulse 79   Temp 98 °F (36.7 °C) (Temporal)   Resp 18   Ht 5' 9\" (1.753 m)   Wt 225 lb (102.1 kg)   BMI 33.23 kg/m²         Assessment:      Problem List Items Addressed This Visit     Abdominal wall skin ulcer, with fat layer exposed ([address] - Primary    Relevant Orders    Supply: Wound Cleanser    Supply: Pratima Wound    Supply: Wound Dressings    Neg. Pressure Wound Therapy    H/O:     Relevant Orders    Supply: Wound Cleanser    Supply: Pratima Wound    Supply: Wound Dressings    Neg. Pressure Wound Therapy    Dehiscence of external surgical wound    Relevant Orders    Supply: Wound Cleanser    Supply: Pratima Wound    Supply: Wound Dressings    Neg. Pressure Wound Therapy    Tobacco abuse    Relevant Orders    Supply: Wound Cleanser    Supply: Pratima Wound    Supply: Wound Dressings    Neg. Pressure Wound Therapy          The patients pain isPain Level: 0  . Wound(s) has moderately improved.     Please refer to nursing measurements and assessment regarding wound pre 08/18/20 0902   Undermining Maxium Distance (cm) 0 08/18/20 0902   Wound Assessment Red;Pink;Drainage;Slough; Yellow 08/18/20 0902   Drainage Amount Small 08/18/20 0902   Drainage Description Serosanguinous 08/18/20 0902   Odor None 08/18/20 0902   Margins Attached edges 08/18/20 0902   Exposed structure Muscle 08/12/20 1136   Pratima-wound Assessment Dry; Intact 08/18/20 0902   Non-staged Wound Description Full thickness 08/18/20 0902   [place]%Wound Bed 30 08/18/20 0902   Red%Wound Bed 65 08/18/20 0902   Yellow%Wound Bed 5 08/18/20 0902   Black%Wound Bed 0 08/18/20 0902   Purple%Wound Bed 0 08/18/20 0902   Other%Wound Bed 0 08/14/20 0929   Number of days: 40           Procedure Note  Indications:  Based on my examination of this patient's wound(s)/ulcer(s) today, debridement is required to promote healing and evaluate the wound base. Performed by: Lynda Parra MD    Consent obtained:  Yes    Time out taken:  Yes    Pain Control: Anesthetic  Anesthetic: 2% Lidocaine Gel Topical       Debridement: Excisional Debridement    Using curette the wound(s)/ulcer(s) was/were sharply debrided down through and including the removal of epidermis, dermis and subcutaneous tissue. Devitalized Tissue Debrided:  fibrin, biofilm and slough    Pre Debridement Measurements:  Are located in the Wound/Ulcer Documentation Flow Sheet    Wound/Ulcer #: 1    Post Debridement Measurements:  Wound/Ulcer Descriptions are Pre Debridement except measurements:          Percent of Wound(s)/Ulcer(s) Debrided: 100%    Total Surface Area Debrided:  5.4 sq cm     Diabetic/Pressure/Non Pressure Ulcers only:  Ulcer: N/A     Estimated Blood Loss:  Minimal    Hemostasis Achieved:  not needed    Response to treatment:  Well tolerated by patient. Plan:          Plan for wound - Dress per physician order  Treatment:     Compression : No   Offloading : Yes   Dressing : see AVS   Additional Therapy : none     1.  Discussed appropriate home care of this wound. Wound redressed. 2. Written patient dismissal instructions given to patient and signed by patient or POA. 3. Follow up: 2 week(s). Ms. Femi Ag wound is doing quite a bit better. Continue with wound vac for now and hopefully switch to a packing in the next week or two when she comes in to the clinic for wound vac changes. Follow up in 2 weeks.      Electronically signed by Yana Adams MD on 8/18/2020 at 10:30 AM

## 2020-08-18 NOTE — PLAN OF CARE
Negative Pressure    NAME:  Oliva Rivera  YOB: 1990  MEDICAL RECORD NUMBER:  [id number]  DATE:  8/18/2020     Applied Negative Pressure to abdomen wound(s)/ulcer(s).  [x] Applied skin barrier prep to elsie-wound.  [x] Cut strips of plastic drape to picture frame wound so that elsie-wound is     covered with the drape.  [x] If bridging dressing to less prominent site, cover any intact skin that will come in contact with the Negative Pressure Therapy sponge, gauze or channel drain with plastic drape. The sponge should never touch intact skin.  [x] Cut sponge, gauze or channel drain to size which will fit into the wound/ulcer bed without being forced.  [x] Be sure the sponge is large enough to hold the entire round plastic flange which is attached to the tubing. Never allow flange to be larger than the sponge or it will produce suction damaging intact skin.  Total number of individual pieces of foam used within the wound bed: 1     [x] If bridging the dressing away from the primary site, be sure the bridge leads to a piece of sponge large enough to hold the entire flange without allowing any of the flange to overlap onto intact skin.  [x] Covered sponge, gauze or channel drain with plastic drape.  [x] Cut a hole in this plastic drape directly over the sponge the same size as the plastic drain tubing.  [x] Removed plastic liner from flange and apply it directly over the hole you cut.  [x] Removed the plastic cover from the flange.  [x] Attached the tubing to the wound/ulcer Negative Pressure Therapy and turn it on to be sure a vacuum is created and that there are no leaks.  [x] If air leaks occur, use plastic drape to patch them.  [x] Secured Negative Pressure Therapy dressing with ace wrap loosely if located on an extremity. Maintain tubing outside of ace wrap. Tubing must not exert pressure on intact skin.     Applied per [facility] Guidelines      Electronically signed by Darío King RN on 8/18/2020 at 12:25 PM

## 2020-08-20 ENCOUNTER — HOSPITAL ENCOUNTER (OUTPATIENT)
Dept: WOUND CARE | Age: 30
Discharge: HOME | End: 2020-08-20
Payer: MEDICAID

## 2020-08-20 VITALS
BODY MASS INDEX: 33.33 KG/M2 | WEIGHT: 225 LBS | RESPIRATION RATE: 20 BRPM | TEMPERATURE: 97.1 F | SYSTOLIC BLOOD PRESSURE: 112 MMHG | HEIGHT: 69 IN | HEART RATE: 69 BPM | DIASTOLIC BLOOD PRESSURE: 74 MMHG

## 2020-08-20 PROCEDURE — 97605 NEG PRS WND THER DME<=50SQCM: CPT

## 2020-08-20 NOTE — PROGRESS NOTES
Negative Pressure    NAME:  Oliva Rivera  YOB: 1990  MEDICAL RECORD NUMBER:  [id number]  DATE:  8/20/2020     Applied Negative Pressure to abdomen wound(s)/ulcer(s).  [x] Applied skin barrier prep to elsie-wound.  [x] Cut strips of plastic drape to picture frame wound so that elsie-wound is     covered with the drape.  [x] If bridging dressing to less prominent site, cover any intact skin that will come in contact with the Negative Pressure Therapy sponge, gauze or channel drain with plastic drape. The sponge should never touch intact skin.  [x] Cut sponge, gauze or channel drain to size which will fit into the wound/ulcer bed without being forced.  [x] Be sure the sponge is large enough to hold the entire round plastic flange which is attached to the tubing. Never allow flange to be larger than the sponge or it will produce suction damaging intact skin.  Total number of individual pieces of foam used within the wound bed: 1     [x] If bridging the dressing away from the primary site, be sure the bridge leads to a piece of sponge large enough to hold the entire flange without allowing any of the flange to overlap onto intact skin.  [x] Covered sponge, gauze or channel drain with plastic drape.  [x] Cut a hole in this plastic drape directly over the sponge the same size as the plastic drain tubing.  [x] Removed plastic liner from flange and apply it directly over the hole you cut.  [x] Removed the plastic cover from the flange.  [x] Attached the tubing to the wound/ulcer Negative Pressure Therapy and turn it on to be sure a vacuum is created and that there are no leaks.  [x] If air leaks occur, use plastic drape to patch them.  [x] Secured Negative Pressure Therapy dressing with ace wrap loosely if located on an extremity. Maintain tubing outside of ace wrap. Tubing must not exert pressure on intact skin.     Applied per [facility] Guidelines      Electronically signed by Leah Morris RN on 8/20/2020 at 11:21 AM

## 2020-08-24 ENCOUNTER — HOSPITAL ENCOUNTER (OUTPATIENT)
Dept: WOUND CARE | Age: 30
Discharge: HOME | End: 2020-08-24
Payer: MEDICAID

## 2020-08-24 VITALS
RESPIRATION RATE: 20 BRPM | HEIGHT: 69 IN | HEART RATE: 71 BPM | SYSTOLIC BLOOD PRESSURE: 118 MMHG | WEIGHT: 225 LBS | TEMPERATURE: 97.4 F | DIASTOLIC BLOOD PRESSURE: 78 MMHG | BODY MASS INDEX: 33.33 KG/M2

## 2020-08-24 PROCEDURE — 97605 NEG PRS WND THER DME<=50SQCM: CPT

## 2020-08-24 NOTE — PLAN OF CARE
Negative Pressure    NAME:  Tu Garrett  YOB: 1990  MEDICAL RECORD NUMBER:  [id number]  DATE:  8/24/2020     Applied Negative Pressure to mid low abdomen wound(s)/ulcer(s).  [x] Applied skin barrier prep to elsie-wound.  [x] Cut strips of plastic drape to picture frame wound so that elsie-wound is     covered with the drape.  [x] If bridging dressing to less prominent site, cover any intact skin that will come in contact with the Negative Pressure Therapy sponge, gauze or channel drain with plastic drape. The sponge should never touch intact skin.  [x] Cut sponge, gauze or channel drain to size which will fit into the wound/ulcer bed without being forced.  [x] Be sure the sponge is large enough to hold the entire round plastic flange which is attached to the tubing. Never allow flange to be larger than the sponge or it will produce suction damaging intact skin.  Total number of individual pieces of foam used within the wound bed: 1     [x] If bridging the dressing away from the primary site, be sure the bridge leads to a piece of sponge large enough to hold the entire flange without allowing any of the flange to overlap onto intact skin.  [x] Covered sponge, gauze or channel drain with plastic drape.  [x] Cut a hole in this plastic drape directly over the sponge the same size as the plastic drain tubing.  [x] Removed plastic liner from flange and apply it directly over the hole you cut.  [x] Removed the plastic cover from the flange.  [x] Attached the tubing to the wound/ulcer Negative Pressure Therapy and turn it on to be sure a vacuum is created and that there are no leaks.  [x] If air leaks occur, use plastic drape to patch them.  [] Secured Negative Pressure Therapy dressing with ace wrap loosely if located on an extremity. Maintain tubing outside of ace wrap. Tubing must not exert pressure on intact skin.     Applied per [facility] Guidelines      Electronically signed by Irwin Coto RN on 8/24/2020 at 10:08 AM

## 2020-08-26 ENCOUNTER — HOSPITAL ENCOUNTER (OUTPATIENT)
Dept: WOUND CARE | Age: 30
Discharge: HOME | End: 2020-08-26
Payer: MEDICAID

## 2020-08-26 VITALS
DIASTOLIC BLOOD PRESSURE: 78 MMHG | HEIGHT: 69 IN | HEART RATE: 71 BPM | WEIGHT: 225 LBS | RESPIRATION RATE: 20 BRPM | BODY MASS INDEX: 33.33 KG/M2 | TEMPERATURE: 97.4 F | SYSTOLIC BLOOD PRESSURE: 118 MMHG

## 2020-08-26 PROCEDURE — 99213 OFFICE O/P EST LOW 20 MIN: CPT

## 2020-08-26 NOTE — PROGRESS NOTES
[facility]     Nursing Assessment and dressing change    NAME:  Jose Roberto Juarez  YOB: 1990  MEDICAL NUMBER:  [id number]  DATE:  8/26/2020    Wound Location: Abdomen    Drainage Amount:  moderate       Wound Cleansed with: Normal Saline    Wound Assessment:   Granulation tissue 90%  Fibrin 10%  Eschar 0%  [] Other:     Dressing Type:              Primary Dressing:  Aquacel AG, ABD, Medipore tape   [] Cover and Secure with:     [] Gauze [x] ABD  [] Kerlix   [] Kate [] Ace Wrap [x] Cover Roll Tape    [] Other:   [] Felt and Foam   Avoid contact of tape with skin. Response to treatment:  Well tolerated by patient.      Electronically signed by Katie Moise RN on 8/26/2020 at 1:02 PM

## 2020-09-01 ENCOUNTER — HOSPITAL ENCOUNTER (OUTPATIENT)
Dept: WOUND CARE | Age: 30
Discharge: HOME | End: 2020-09-01
Payer: MEDICAID

## 2020-09-01 VITALS
WEIGHT: 225 LBS | HEIGHT: 69 IN | TEMPERATURE: 97.2 F | BODY MASS INDEX: 33.33 KG/M2 | DIASTOLIC BLOOD PRESSURE: 79 MMHG | SYSTOLIC BLOOD PRESSURE: 124 MMHG | RESPIRATION RATE: 20 BRPM | HEART RATE: 100 BPM

## 2020-09-01 PROCEDURE — 11042 DBRDMT SUBQ TIS 1ST 20SQCM/<: CPT

## 2020-09-01 PROCEDURE — 11042 DBRDMT SUBQ TIS 1ST 20SQCM/<: CPT | Performed by: SURGERY

## 2020-09-01 RX ORDER — LIDOCAINE HYDROCHLORIDE 40 MG/ML
SOLUTION TOPICAL ONCE
Status: CANCELLED | OUTPATIENT
Start: 2020-09-01

## 2020-09-01 ASSESSMENT — PAIN SCALES - GENERAL: PAINLEVEL_OUTOF10: 0

## 2020-09-01 ASSESSMENT — PAIN DESCRIPTION - LOCATION: LOCATION: ABDOMEN

## 2020-09-01 ASSESSMENT — PAIN DESCRIPTION - PROGRESSION: CLINICAL_PROGRESSION: GRADUALLY IMPROVING

## 2020-09-01 ASSESSMENT — PAIN DESCRIPTION - PAIN TYPE: TYPE: ACUTE PAIN;SURGICAL PAIN

## 2020-09-01 ASSESSMENT — PAIN DESCRIPTION - ORIENTATION: ORIENTATION: MID;LOWER

## 2020-09-01 NOTE — PROGRESS NOTES
1011   Wound Length (cm) 0.7 cm 09/01/20 1011   Wound Width (cm) 3.5 cm 09/01/20 1011   Wound Depth (cm) 0.1 cm 09/01/20 1011   Wound Surface Area (cm^2) 2.45 cm^2 09/01/20 1011   Change in Wound Size % (l*w) -288.89 09/01/20 1011   Wound Volume (cm^3) 0.24 cm^3 09/01/20 1011   Wound Healing % 89 09/01/20 1011   Post-Procedure Length (cm) 0.7 cm 09/01/20 1020   Post-Procedure Width (cm) 3.5 cm 09/01/20 1020   Post-Procedure Depth (cm) 0.1 cm 09/01/20 1020   Post-Procedure Surface Area (cm^2) 2.45 cm^2 09/01/20 1020   Post-Procedure Volume (cm^3) 0.24 cm^3 09/01/20 1020   Distance Tunneling (cm) 0 cm 09/01/20 1011   Tunneling Position ___ O'Clock 0 09/01/20 1011   Undermining Starts ___ O'Clock 0 09/01/20 1011   Undermining Ends___ O'Clock 0 09/01/20 1011   Undermining Maxium Distance (cm) 0 09/01/20 1011   Wound Assessment Red;[place] 09/01/20 1011   Drainage Amount Small 09/01/20 1011   Drainage Description Serosanguinous 09/01/20 1011   Odor None 09/01/20 1011   Margins Attached edges 09/01/20 1011   Exposed structure Muscle 09/01/20 1011   Pratima-wound Assessment Clean;Dry; Intact 09/01/20 1011   Non-staged Wound Description Full thickness 09/01/20 1011   [place]%Wound Bed 65 09/01/20 1011   Red%Wound Bed 35 09/01/20 1011   Yellow%Wound Bed 0 09/01/20 1011   Black%Wound Bed 0 09/01/20 1011   Purple%Wound Bed 0 09/01/20 1011   Other%Wound Bed 0 08/24/20 0945   Number of days: 54          Supplies Requested :      WOUND #: 1   PRIMARY DRESSING:  Other: Aquacel AG   Cover and Secure with: ABD pad     FREQUENCY OF DRESSING CHANGES:  Daily       ADDITIONAL ITEMS:  [] Gloves Small  [x] Gloves Medium [] Gloves Large [] Gloves XLarge  [] Tape 1\" [] Tape 2\" [x] Tape 3\"  [] Medipore Tape  [x] Saline  [] Skin Prep   [] Adhesive Remover   [] Cotton Tip Applicators   [] Other:    Patient Wound(s) Debrided: [x] Yes if yes please add date 9/1/20   [] No    Debribement Type: Excisional/Sharp    Patient currently being seen by Home Health: [] Yes   [x] No    Duration for needed supplies:  []15  [x]30  []60  []90 Days    Electronically signed by Will Hughes RN on 9/1/2020 at 11:14 AM     Provider Information:      PROVIDER'S NAME: Dr Clyde Castanon: [id number]

## 2020-09-01 NOTE — PROGRESS NOTES
[facility] [address]  Progress Note and Procedure Note      Stoney Gowers  AGE: 34 y.o. GENDER: female  : 1990  TODAY'S DATE:  2020    Subjective:     CHIEF COMPLAINT abdominal wall     HISTORY of PRESENT ILLNESS HPI   Stoney Gowers is a 34 y.o. female who presents today for wound/ulcer evaluation. Ulcer Type:non-healing surgical  Ulcer/Wound Location:lower abdominal, c section scar  Contributing factors:shear force, obesity and smoking    Patient Active Problem List   Diagnosis Code    BMI 38.0-38.9,adult Z68.38    Snoring R06.83    Somnolence, daytime R40.0    Obstructive sleep apnea G47.33    CPAP (continuous positive airway pressure) dependence Z99.89    Abdominal wall skin ulcer, with fat layer exposed ([address] L98.492    H/O:  Z98.891    Dehiscence of external surgical wound T81. 31XA    Tobacco abuse Z72.0       ALLERGIES    No Known Allergies        Objective:      /79   Pulse 100   Temp 97.2 °F (36.2 °C) (Temporal)   Resp 20   Ht 5' 9\" (1.753 m)   Wt 225 lb (102.1 kg)   BMI 33.23 kg/m²         Assessment:      Problem List Items Addressed This Visit     Abdominal wall skin ulcer, with fat layer exposed ([facility] [address] - Primary    Relevant Medications    Lidocaine 2 % GEL (Start on 2020 10:30 AM)    Other Relevant Orders    Supply: Wound Cleanser    Supply: Wound Dressings    H/O:     Relevant Medications    Lidocaine 2 % GEL (Start on 2020 10:30 AM)    Other Relevant Orders    Supply: Wound Cleanser    Supply: Wound Dressings    Dehiscence of external surgical wound    Relevant Medications    Lidocaine 2 % GEL (Start on 2020 10:30 AM)    Other Relevant Orders    Supply: Wound Cleanser    Supply: Wound Dressings    Tobacco abuse    Relevant Medications    Lidocaine 2 % GEL (Start on 2020 10:30 AM)    Other Relevant Orders    Supply: Wound Cleanser    Supply: Wound Dressings          The patients pain isPain Level: 0 Pain Tunneling (cm) 0 cm 09/01/20 1011   Tunneling Position ___ O'Clock 0 09/01/20 1011   Undermining Starts ___ O'Clock 0 09/01/20 1011   Undermining Ends___ O'Clock 0 09/01/20 1011   Undermining Maxium Distance (cm) 0 09/01/20 1011   Wound Assessment Red;[place] 09/01/20 1011   Drainage Amount Small 09/01/20 1011   Drainage Description Serosanguinous 09/01/20 1011   Odor None 09/01/20 1011   Margins Attached edges 09/01/20 1011   Exposed structure Muscle 09/01/20 1011   Pratima-wound Assessment Clean;Dry; Intact 09/01/20 1011   Non-staged Wound Description Full thickness 09/01/20 1011   [place]%Wound Bed 65 09/01/20 1011   Red%Wound Bed 35 09/01/20 1011   Yellow%Wound Bed 0 09/01/20 1011   Black%Wound Bed 0 09/01/20 1011   Purple%Wound Bed 0 09/01/20 1011   Other%Wound Bed 0 08/24/20 0945   Number of days: 54           Procedure Note  Indications:  Based on my examination of this patient's wound(s)/ulcer(s) today, debridement is required to promote healing and evaluate the wound base. Performed by: Susan Crocker MD    Consent obtained:  Yes    Time out taken:  Yes    Pain Control: Anesthetic  Anesthetic: 2% Lidocaine Gel Topical       Debridement: Excisional Debridement    Using curette the wound(s)/ulcer(s) was/were sharply debrided down through and including the removal of epidermis, dermis and subcutaneous tissue. Devitalized Tissue Debrided:  fibrin, biofilm and slough    Pre Debridement Measurements:  Are located in the Wound/Ulcer Documentation Flow Sheet    Wound/Ulcer #: 1    Post Debridement Measurements:  Wound/Ulcer Descriptions are Pre Debridement except measurements:          Percent of Wound(s)/Ulcer(s) Debrided: 100%    Total Surface Area Debrided:  2.45 sq cm     Diabetic/Pressure/Non Pressure Ulcers only:  Ulcer: N/A     Estimated Blood Loss:  Minimal    Hemostasis Achieved:  by pressure    Response to treatment:  Well tolerated by patient.            Plan:          Plan for wound - Dress per physician order  Treatment:     Compression : No   Offloading : No   Dressing : see AVS   Additional Therapy : none     1. Discussed appropriate home care of this wound. Wound redressed. 2. Written patient dismissal instructions given to patient and signed by patient or POA. 3. Follow up: 2 week(s). Ms. Javier Padgett wound is improving. There was hypergranulation tissue, which was debrided. Continue holding wound vac, and follow up in 2 weeks.     Electronically signed by Merlin Finnegan MD on 9/1/2020 at 10:25 AM

## 2020-09-15 ENCOUNTER — HOSPITAL ENCOUNTER (OUTPATIENT)
Dept: WOUND CARE | Age: 30
Discharge: HOME | End: 2020-09-15
Payer: MEDICAID

## 2020-09-15 VITALS
HEART RATE: 74 BPM | TEMPERATURE: 97.3 F | RESPIRATION RATE: 16 BRPM | DIASTOLIC BLOOD PRESSURE: 87 MMHG | WEIGHT: 225 LBS | BODY MASS INDEX: 33.33 KG/M2 | HEIGHT: 69 IN | SYSTOLIC BLOOD PRESSURE: 118 MMHG

## 2020-09-15 PROCEDURE — 97597 DBRDMT OPN WND 1ST 20 CM/<: CPT | Performed by: SURGERY

## 2020-09-15 PROCEDURE — 97597 DBRDMT OPN WND 1ST 20 CM/<: CPT

## 2020-09-15 ASSESSMENT — PAIN SCALES - GENERAL: PAINLEVEL_OUTOF10: 0

## 2020-09-29 NOTE — PROGRESS NOTES
[facility] [address]  Progress Note and Procedure Note      Carolyn Quinones  AGE: 34 y.o. GENDER: female  : 1990  TODAY'S DATE:  9/15/2020    Subjective:     CHIEF COMPLAINT abdominal wound     HISTORY of PRESENT ILLNESS HPI   Carolyn Quinones is a 34 y.o. female who presents today for wound/ulcer evaluation. Ulcer Type:non-healing surgical  Ulcer/Wound Location:lower abdomen  Contributing factors:chronic pressure, shear force, obesity and smoking    Patient Active Problem List   Diagnosis Code    BMI 38.0-38.9,adult Z68.38    Snoring R06.83    Somnolence, daytime R40.0    Obstructive sleep apnea G47.33    CPAP (continuous positive airway pressure) dependence Z99.89    Abdominal wall skin ulcer, with fat layer exposed ([address] L98.492    H/O:  Z98.891    Dehiscence of external surgical wound T81. 31XA    Tobacco abuse Z72.0       ALLERGIES    No Known Allergies        Objective:      /87   Pulse 74   Temp 97.3 °F (36.3 °C) (Temporal)   Resp 16   Ht 5' 9\" (1.753 m)   Wt 225 lb (102.1 kg)   BMI 33.23 kg/m²         Assessment:      Problem List Items Addressed This Visit     Abdominal wall skin ulcer, with fat layer exposed ([address]    H/O:     Dehiscence of external surgical wound    Tobacco abuse          The patients pain isPain Level: 0  . Wound(s) has moderately improved. Please refer to nursing measurements and assessment regarding wound pre and post debridement. Wound 20 Abdomen Mid;Lower Wound 1 Non Healing Surgical, Mid Lower abdomen  (Active)   Wound Image   09/15/20 1032   Wound Non-Healing Surgical 09/15/20 1032   Offloading for Diabetic Foot Ulcers No offloading required 09/15/20 1032   Dressing Status Changed;Clean;Dry; Intact 20 1034   Dressing Changed Changed/New 09/15/20 1050   Dressing/Treatment Alginate with Ag;ABD 09/15/20 1050   Wound Cleansed Rinsed/Irrigated with saline 09/15/20 1032   Wound Length (cm) 0.5 cm Wound/Ulcer Documentation Flow Sheet    Wound/Ulcer #: 1    Post Debridement Measurements:  Wound/Ulcer Descriptions are Pre Debridement except measurements:          Percent of Wound(s)/Ulcer(s) Debrided: 100%    Total Surface Area Debrided:  1.0 sq cm     Diabetic/Pressure/Non Pressure Ulcers only:  Ulcer: N/A     Estimated Blood Loss:  Minimal    Hemostasis Achieved:  not needed    Response to treatment:  Well tolerated by patient. Plan:          Plan for wound - Dress per physician order  Treatment:     Compression : No   Offloading : No   Dressing : see AVS   Additional Therapy : none     1. Discussed appropriate home care of this wound. Wound redressed. 2. Written patient dismissal instructions given to patient and signed by patient or POA. 3. Follow up: 2 week(s). 's wound is getting smaller and more superficial. Continue with xeroform. Work on decreased smoking and keeping surrounding skin dry. Follow up in 2 weeks.      Electronically signed by Marcus Nieves MD on 9/29/2020 at 10:30 AM

## 2020-10-06 ENCOUNTER — HOSPITAL ENCOUNTER (OUTPATIENT)
Dept: WOUND CARE | Age: 30
Discharge: HOME | End: 2020-10-06
Payer: MEDICAID

## 2020-10-06 VITALS
TEMPERATURE: 98 F | BODY MASS INDEX: 33.33 KG/M2 | SYSTOLIC BLOOD PRESSURE: 119 MMHG | RESPIRATION RATE: 16 BRPM | DIASTOLIC BLOOD PRESSURE: 78 MMHG | HEIGHT: 69 IN | HEART RATE: 72 BPM | WEIGHT: 225 LBS

## 2020-10-06 PROCEDURE — 99212 OFFICE O/P EST SF 10 MIN: CPT

## 2020-10-06 PROCEDURE — 99211 OFF/OP EST MAY X REQ PHY/QHP: CPT | Performed by: SURGERY

## 2020-10-06 ASSESSMENT — PAIN SCALES - GENERAL: PAINLEVEL_OUTOF10: 0

## 2020-10-06 ASSESSMENT — PAIN DESCRIPTION - PROGRESSION: CLINICAL_PROGRESSION: GRADUALLY IMPROVING

## 2020-10-06 NOTE — PLAN OF CARE
Problem: Wound:  Goal: Will show signs of wound healing; wound closure and no evidence of infection  Description: Will show signs of wound healing; wound closure and no evidence of infection  Outcome: Completed     Problem: Smoking cessation:  Goal: Ability to formulate a plan to maintain a tobacco-free life will be supported  Description: Ability to formulate a plan to maintain a tobacco-free life will be supported  Outcome: Ongoing

## 2020-10-06 NOTE — PLAN OF CARE
Problem: Wound:  Goal: Will show signs of wound healing; wound closure and no evidence of infection  Description: Will show signs of wound healing; wound closure and no evidence of infection  10/6/2020 1038 by Irving Lima RN  Outcome: Completed     Problem: Smoking cessation:  Goal: Ability to formulate a plan to maintain a tobacco-free life will be supported  Description: Ability to formulate a plan to maintain a tobacco-free life will be supported  10/6/2020 1219 by Darnell Bob RN  Outcome: Completed  10/6/2020 1038 by Irving Lima RN  Outcome: Ongoing

## 2020-10-06 NOTE — PROGRESS NOTES
[facility] [address]  Progress Note and Procedure Note      Violet Crouch  AGE: 34 y.o. GENDER: female  : 1990  TODAY'S DATE:  10/6/2020    Subjective:     CHIEF COMPLAINT c section wound     HISTORY of PRESENT ILLNESS HPI   Violet Crouch is a 34 y.o. female who presents today for wound/ulcer evaluation. Ulcer Type:non-healing surgical  Ulcer/Wound Location:lower abdomen  Contributing factors:shear force, obesity and smoking    Patient Active Problem List   Diagnosis Code    BMI 38.0-38.9,adult Z68.38    Snoring R06.83    Somnolence, daytime R40.0    Obstructive sleep apnea G47.33    CPAP (continuous positive airway pressure) dependence Z99.89    Abdominal wall skin ulcer, with fat layer exposed ([address] L98.492    H/O:  Z98.891    Dehiscence of external surgical wound T81. 31XA    Tobacco abuse Z72.0       ALLERGIES    No Known Allergies        Objective:      /78   Pulse 72   Temp 98 °F (36.7 °C) (Temporal)   Resp 16   Ht 5' 9\" (1.753 m)   Wt 225 lb (102.1 kg)   BMI 33.23 kg/m²         Assessment:      Problem List Items Addressed This Visit     Abdominal wall skin ulcer, with fat layer exposed ([address]    H/O:     Dehiscence of external surgical wound    Tobacco abuse          The patients pain isPain Level: 0  . Wound(s) healed. Please refer to nursing measurements and assessment regarding wound pre and post debridement. Wound 20 Abdomen Mid;Lower Wound 1 Non Healing Surgical, Mid Lower abdomen  (Active)   Wound Image   10/06/20 1038   Wound Non-Healing Surgical 10/06/20 1038   Offloading for Diabetic Foot Ulcers No offloading required 10/06/20 1038   Dressing Status Changed;Clean;Dry; Intact 20 1034   Dressing Changed Changed/New 09/15/20 1050   Dressing/Treatment Alginate with Ag;ABD 09/15/20 1050   Wound Cleansed Not Cleansed 10/06/20 1038   Wound Length (cm) 0 cm 10/06/20 1038   Wound Width (cm) 0 cm 10/06/20 1038 Wound Depth (cm) 0 cm 10/06/20 1038   Wound Surface Area (cm^2) 0 cm^2 10/06/20 1038   Change in Wound Size % (l*w) 100 10/06/20 1038   Wound Volume (cm^3) 0 cm^3 10/06/20 1038   Wound Healing % 100 10/06/20 1038   Post-Procedure Length (cm) 0 cm 10/06/20 1056   Post-Procedure Width (cm) 0 cm 10/06/20 1056   Post-Procedure Depth (cm) 0 cm 10/06/20 1056   Post-Procedure Surface Area (cm^2) 0 cm^2 10/06/20 1056   Post-Procedure Volume (cm^3) 0 cm^3 10/06/20 1056   Distance Tunneling (cm) 0 cm 10/06/20 1038   Tunneling Position ___ O'Clock 0 10/06/20 1038   Undermining Starts ___ O'Clock 0 10/06/20 1038   Undermining Ends___ O'Clock 0 10/06/20 1038   Undermining Maxium Distance (cm) 0 10/06/20 1038   Wound Assessment Epithelialization 10/06/20 1038   Drainage Amount None 10/06/20 1038   Drainage Description Serosanguinous 09/15/20 1032   Odor None 10/06/20 1038   Margins Attached edges 09/15/20 1032   Exposed structure Muscle 09/15/20 1032   Pratima-wound Assessment Clean;Dry; Intact 09/15/20 1032   Non-staged Wound Description Full thickness 09/15/20 1032   [place]%Wound Bed 0 10/06/20 1038   Red%Wound Bed 0 10/06/20 1038   Yellow%Wound Bed 0 10/06/20 1038   Black%Wound Bed 0 10/06/20 1038   Purple%Wound Bed 0 10/06/20 1038   Other%Wound Bed 0 10/06/20 1038   Number of days: 89           Plan:          Plan for wound - Dress per physician order  Treatment:     Compression : No   Offloading : No   Dressing : see AVS   Additional Therapy : none     1. Discussed appropriate home care of this wound. Wound redressed. 2. Written patient dismissal instructions given to patient and signed by patient or POA. 3. Follow up: as needed    Wound is healed. Encouraged her to work on continuing skin care in the area. Follow up as needed.     Electronically signed by Rios Seaman MD on 10/6/2020 at 10:57 AM

## 2021-01-21 LAB
EXTERNAL HEPATITIS B SURFACE ANTIGEN: NEGATIVE
EXTERNAL HEPATITIS C AB: NEGATIVE
EXTERNAL HERPES CULTURE: NORMAL
EXTERNAL RUBELLA QUALITATIVE: NORMAL
EXTERNAL SYPHILIS RPR SCREEN: NORMAL
HIV1 P24 AG SERPL QL IA: NORMAL

## 2021-04-15 LAB — EXTERNAL AFP: NORMAL

## 2021-07-01 LAB — EXTERNAL ANTIBODY SCREEN: NEGATIVE

## 2021-09-20 ENCOUNTER — TRANSCRIBE ORDERS (OUTPATIENT)
Dept: LAB | Facility: HOSPITAL | Age: 31
End: 2021-09-20

## 2021-09-20 DIAGNOSIS — Z01.818 PREOPERATIVE TESTING: Primary | ICD-10-CM

## 2021-09-27 ENCOUNTER — LAB (OUTPATIENT)
Dept: LAB | Facility: HOSPITAL | Age: 31
End: 2021-09-27

## 2021-09-27 LAB — SARS-COV-2 ORF1AB RESP QL NAA+PROBE: NOT DETECTED

## 2021-09-27 PROCEDURE — C9803 HOPD COVID-19 SPEC COLLECT: HCPCS | Performed by: OBSTETRICS & GYNECOLOGY

## 2021-09-27 PROCEDURE — U0004 COV-19 TEST NON-CDC HGH THRU: HCPCS | Performed by: OBSTETRICS & GYNECOLOGY

## 2021-09-28 ENCOUNTER — PREP FOR SURGERY (OUTPATIENT)
Dept: OTHER | Facility: HOSPITAL | Age: 31
End: 2021-09-28

## 2021-09-28 DIAGNOSIS — O34.211 MATERNAL CARE DUE TO LOW TRANSVERSE UTERINE SCAR FROM PREVIOUS CESAREAN DELIVERY: Primary | ICD-10-CM

## 2021-09-28 RX ORDER — SODIUM CHLORIDE 0.9 % (FLUSH) 0.9 %
3-10 SYRINGE (ML) INJECTION AS NEEDED
Status: CANCELLED | OUTPATIENT
Start: 2021-09-29

## 2021-09-28 RX ORDER — LIDOCAINE HYDROCHLORIDE 10 MG/ML
5 INJECTION, SOLUTION EPIDURAL; INFILTRATION; INTRACAUDAL; PERINEURAL AS NEEDED
Status: CANCELLED | OUTPATIENT
Start: 2021-09-29

## 2021-09-28 RX ORDER — BUPIVACAINE HCL/0.9 % NACL/PF 0.1 %
2 PLASTIC BAG, INJECTION (ML) EPIDURAL ONCE
Status: CANCELLED | OUTPATIENT
Start: 2021-09-29

## 2021-09-28 RX ORDER — SODIUM CHLORIDE, SODIUM LACTATE, POTASSIUM CHLORIDE, CALCIUM CHLORIDE 600; 310; 30; 20 MG/100ML; MG/100ML; MG/100ML; MG/100ML
125 INJECTION, SOLUTION INTRAVENOUS CONTINUOUS
Status: CANCELLED | OUTPATIENT
Start: 2021-09-29

## 2021-09-28 RX ORDER — SODIUM CHLORIDE 0.9 % (FLUSH) 0.9 %
3 SYRINGE (ML) INJECTION EVERY 12 HOURS SCHEDULED
Status: CANCELLED | OUTPATIENT
Start: 2021-09-29

## 2021-09-29 ENCOUNTER — ANESTHESIA (OUTPATIENT)
Dept: LABOR AND DELIVERY | Facility: HOSPITAL | Age: 31
End: 2021-09-29

## 2021-09-29 ENCOUNTER — ANESTHESIA EVENT (OUTPATIENT)
Dept: LABOR AND DELIVERY | Facility: HOSPITAL | Age: 31
End: 2021-09-29

## 2021-09-29 ENCOUNTER — HOSPITAL ENCOUNTER (INPATIENT)
Facility: HOSPITAL | Age: 31
LOS: 2 days | Discharge: HOME | End: 2021-10-01
Attending: OBSTETRICS & GYNECOLOGY | Admitting: OBSTETRICS & GYNECOLOGY

## 2021-09-29 DIAGNOSIS — O34.219 DELIVERED BY CESAREAN DELIVERY FOLLOWING PREVIOUS CESAREAN DELIVERY: ICD-10-CM

## 2021-09-29 DIAGNOSIS — O34.211 MATERNAL CARE DUE TO LOW TRANSVERSE UTERINE SCAR FROM PREVIOUS CESAREAN DELIVERY: ICD-10-CM

## 2021-09-29 LAB
ABO GROUP BLD: NORMAL
BASOPHILS # BLD AUTO: 0.02 10*3/MM3 (ref 0–0.2)
BASOPHILS NFR BLD AUTO: 0.3 % (ref 0–1.5)
BLD GP AB SCN SERPL QL: NEGATIVE
DEPRECATED RDW RBC AUTO: 44.3 FL (ref 37–54)
EOSINOPHIL # BLD AUTO: 0.11 10*3/MM3 (ref 0–0.4)
EOSINOPHIL NFR BLD AUTO: 1.5 % (ref 0.3–6.2)
ERYTHROCYTE [DISTWIDTH] IN BLOOD BY AUTOMATED COUNT: 12.4 % (ref 12.3–15.4)
HCT VFR BLD AUTO: 34.8 % (ref 34–46.6)
HGB BLD-MCNC: 12.1 G/DL (ref 12–15.9)
IMM GRANULOCYTES # BLD AUTO: 0.01 10*3/MM3 (ref 0–0.05)
IMM GRANULOCYTES NFR BLD AUTO: 0.1 % (ref 0–0.5)
LYMPHOCYTES # BLD AUTO: 2.37 10*3/MM3 (ref 0.7–3.1)
LYMPHOCYTES NFR BLD AUTO: 31.3 % (ref 19.6–45.3)
MCH RBC QN AUTO: 33.9 PG (ref 26.6–33)
MCHC RBC AUTO-ENTMCNC: 34.8 G/DL (ref 31.5–35.7)
MCV RBC AUTO: 97.5 FL (ref 79–97)
MONOCYTES # BLD AUTO: 0.41 10*3/MM3 (ref 0.1–0.9)
MONOCYTES NFR BLD AUTO: 5.4 % (ref 5–12)
NEUTROPHILS NFR BLD AUTO: 4.66 10*3/MM3 (ref 1.7–7)
NEUTROPHILS NFR BLD AUTO: 61.4 % (ref 42.7–76)
NRBC BLD AUTO-RTO: 0 /100 WBC (ref 0–0.2)
PLATELET # BLD AUTO: 244 10*3/MM3 (ref 140–450)
PMV BLD AUTO: 10.6 FL (ref 6–12)
RBC # BLD AUTO: 3.57 10*6/MM3 (ref 3.77–5.28)
RH BLD: POSITIVE
T&S EXPIRATION DATE: NORMAL
WBC # BLD AUTO: 7.58 10*3/MM3 (ref 3.4–10.8)

## 2021-09-29 PROCEDURE — 51702 INSERT TEMP BLADDER CATH: CPT

## 2021-09-29 PROCEDURE — 25010000002 HYDROMORPHONE 1 MG/ML SOLUTION: Performed by: NURSE ANESTHETIST, CERTIFIED REGISTERED

## 2021-09-29 PROCEDURE — 85025 COMPLETE CBC W/AUTO DIFF WBC: CPT | Performed by: OBSTETRICS & GYNECOLOGY

## 2021-09-29 PROCEDURE — 86901 BLOOD TYPING SEROLOGIC RH(D): CPT | Performed by: OBSTETRICS & GYNECOLOGY

## 2021-09-29 PROCEDURE — 94799 UNLISTED PULMONARY SVC/PX: CPT

## 2021-09-29 PROCEDURE — 25010000002 NALBUPHINE PER 10 MG: Performed by: NURSE ANESTHETIST, CERTIFIED REGISTERED

## 2021-09-29 PROCEDURE — 88302 TISSUE EXAM BY PATHOLOGIST: CPT | Performed by: OBSTETRICS & GYNECOLOGY

## 2021-09-29 PROCEDURE — 25010000002 CEFAZOLIN PER 500 MG: Performed by: OBSTETRICS & GYNECOLOGY

## 2021-09-29 PROCEDURE — 86850 RBC ANTIBODY SCREEN: CPT | Performed by: OBSTETRICS & GYNECOLOGY

## 2021-09-29 PROCEDURE — 25010000002 FENTANYL CITRATE (PF) 100 MCG/2ML SOLUTION: Performed by: NURSE ANESTHETIST, CERTIFIED REGISTERED

## 2021-09-29 PROCEDURE — 0UL70ZZ OCCLUSION OF BILATERAL FALLOPIAN TUBES, OPEN APPROACH: ICD-10-PCS | Performed by: OBSTETRICS & GYNECOLOGY

## 2021-09-29 PROCEDURE — 25010000002 CEFEPIME PER 500 MG: Performed by: OBSTETRICS & GYNECOLOGY

## 2021-09-29 PROCEDURE — 25010000002 ONDANSETRON PER 1 MG: Performed by: NURSE ANESTHETIST, CERTIFIED REGISTERED

## 2021-09-29 PROCEDURE — 25010000002 PHENYLEPHRINE HCL 0.8 MG/10ML SOLUTION PREFILLED SYRINGE: Performed by: NURSE ANESTHETIST, CERTIFIED REGISTERED

## 2021-09-29 PROCEDURE — 86900 BLOOD TYPING SEROLOGIC ABO: CPT | Performed by: OBSTETRICS & GYNECOLOGY

## 2021-09-29 PROCEDURE — 25010000002 DEXAMETHASONE PER 1 MG: Performed by: NURSE ANESTHETIST, CERTIFIED REGISTERED

## 2021-09-29 PROCEDURE — 88307 TISSUE EXAM BY PATHOLOGIST: CPT | Performed by: OBSTETRICS & GYNECOLOGY

## 2021-09-29 RX ORDER — OXYTOCIN 10 [USP'U]/ML
INJECTION, SOLUTION INTRAMUSCULAR; INTRAVENOUS AS NEEDED
Status: DISCONTINUED | OUTPATIENT
Start: 2021-09-29 | End: 2021-09-29 | Stop reason: SURG

## 2021-09-29 RX ORDER — DOCUSATE SODIUM 100 MG/1
100 CAPSULE, LIQUID FILLED ORAL 2 TIMES DAILY PRN
Status: DISCONTINUED | OUTPATIENT
Start: 2021-09-29 | End: 2021-10-01 | Stop reason: HOSPADM

## 2021-09-29 RX ORDER — LIDOCAINE HYDROCHLORIDE 10 MG/ML
5 INJECTION, SOLUTION EPIDURAL; INFILTRATION; INTRACAUDAL; PERINEURAL AS NEEDED
Status: DISCONTINUED | OUTPATIENT
Start: 2021-09-29 | End: 2021-09-29 | Stop reason: HOSPADM

## 2021-09-29 RX ORDER — NALOXONE HCL 0.4 MG/ML
0.4 VIAL (ML) INJECTION
Status: DISCONTINUED | OUTPATIENT
Start: 2021-09-29 | End: 2021-09-29

## 2021-09-29 RX ORDER — ONDANSETRON 2 MG/ML
INJECTION INTRAMUSCULAR; INTRAVENOUS AS NEEDED
Status: DISCONTINUED | OUTPATIENT
Start: 2021-09-29 | End: 2021-09-29 | Stop reason: SURG

## 2021-09-29 RX ORDER — SODIUM CHLORIDE 0.9 % (FLUSH) 0.9 %
3-10 SYRINGE (ML) INJECTION AS NEEDED
Status: DISCONTINUED | OUTPATIENT
Start: 2021-09-29 | End: 2021-09-29 | Stop reason: HOSPADM

## 2021-09-29 RX ORDER — BUPIVACAINE HCL/0.9 % NACL/PF 0.1 %
2 PLASTIC BAG, INJECTION (ML) EPIDURAL ONCE
Status: COMPLETED | OUTPATIENT
Start: 2021-09-29 | End: 2021-09-29

## 2021-09-29 RX ORDER — OXYCODONE AND ACETAMINOPHEN 7.5; 325 MG/1; MG/1
2 TABLET ORAL EVERY 4 HOURS PRN
Status: DISCONTINUED | OUTPATIENT
Start: 2021-09-29 | End: 2021-09-29

## 2021-09-29 RX ORDER — OXYTOCIN/0.9 % SODIUM CHLORIDE 30/500 ML
250 PLASTIC BAG, INJECTION (ML) INTRAVENOUS CONTINUOUS
Status: ACTIVE | OUTPATIENT
Start: 2021-09-29 | End: 2021-09-29

## 2021-09-29 RX ORDER — SODIUM CHLORIDE 0.9 % (FLUSH) 0.9 %
3-10 SYRINGE (ML) INJECTION AS NEEDED
Status: DISCONTINUED | OUTPATIENT
Start: 2021-09-29 | End: 2021-10-01 | Stop reason: HOSPADM

## 2021-09-29 RX ORDER — OXYCODONE AND ACETAMINOPHEN 7.5; 325 MG/1; MG/1
1 TABLET ORAL EVERY 4 HOURS PRN
Status: DISCONTINUED | OUTPATIENT
Start: 2021-09-29 | End: 2021-09-29

## 2021-09-29 RX ORDER — LANOLIN ALCOHOL/MO/W.PET/CERES
1000 CREAM (GRAM) TOPICAL DAILY
Status: ON HOLD | COMMUNITY
End: 2021-09-29

## 2021-09-29 RX ORDER — OXYTOCIN/0.9 % SODIUM CHLORIDE 30/500 ML
125 PLASTIC BAG, INJECTION (ML) INTRAVENOUS CONTINUOUS PRN
Status: COMPLETED | OUTPATIENT
Start: 2021-09-29 | End: 2021-09-29

## 2021-09-29 RX ORDER — BUTORPHANOL TARTRATE 1 MG/ML
0.5 INJECTION, SOLUTION INTRAMUSCULAR; INTRAVENOUS EVERY 6 HOURS PRN
Status: DISCONTINUED | OUTPATIENT
Start: 2021-09-29 | End: 2021-09-29

## 2021-09-29 RX ORDER — SODIUM CHLORIDE, SODIUM LACTATE, POTASSIUM CHLORIDE, CALCIUM CHLORIDE 600; 310; 30; 20 MG/100ML; MG/100ML; MG/100ML; MG/100ML
125 INJECTION, SOLUTION INTRAVENOUS CONTINUOUS
Status: DISCONTINUED | OUTPATIENT
Start: 2021-09-29 | End: 2021-10-01 | Stop reason: HOSPADM

## 2021-09-29 RX ORDER — NALBUPHINE HCL 10 MG/ML
2.5 AMPUL (ML) INJECTION EVERY 4 HOURS PRN
Status: DISCONTINUED | OUTPATIENT
Start: 2021-09-29 | End: 2021-09-29

## 2021-09-29 RX ORDER — SODIUM CHLORIDE 0.9 % (FLUSH) 0.9 %
3 SYRINGE (ML) INJECTION EVERY 12 HOURS SCHEDULED
Status: DISCONTINUED | OUTPATIENT
Start: 2021-09-29 | End: 2021-09-29 | Stop reason: HOSPADM

## 2021-09-29 RX ORDER — TRISODIUM CITRATE DIHYDRATE AND CITRIC ACID MONOHYDRATE 500; 334 MG/5ML; MG/5ML
30 SOLUTION ORAL ONCE
Status: COMPLETED | OUTPATIENT
Start: 2021-09-29 | End: 2021-09-29

## 2021-09-29 RX ORDER — VALACYCLOVIR HYDROCHLORIDE 500 MG/1
500 TABLET, FILM COATED ORAL 2 TIMES DAILY
Status: ON HOLD | COMMUNITY
End: 2021-09-29

## 2021-09-29 RX ORDER — PROMETHAZINE HYDROCHLORIDE 25 MG/1
25 TABLET ORAL EVERY 6 HOURS PRN
Status: DISCONTINUED | OUTPATIENT
Start: 2021-09-29 | End: 2021-10-01 | Stop reason: HOSPADM

## 2021-09-29 RX ORDER — ONDANSETRON 2 MG/ML
4 INJECTION INTRAMUSCULAR; INTRAVENOUS EVERY 6 HOURS PRN
Status: DISCONTINUED | OUTPATIENT
Start: 2021-09-29 | End: 2021-10-01 | Stop reason: HOSPADM

## 2021-09-29 RX ORDER — ONDANSETRON 2 MG/ML
4 INJECTION INTRAMUSCULAR; INTRAVENOUS ONCE AS NEEDED
Status: DISCONTINUED | OUTPATIENT
Start: 2021-09-29 | End: 2021-09-29 | Stop reason: HOSPADM

## 2021-09-29 RX ORDER — FENTANYL CITRATE 50 UG/ML
INJECTION, SOLUTION INTRAMUSCULAR; INTRAVENOUS AS NEEDED
Status: DISCONTINUED | OUTPATIENT
Start: 2021-09-29 | End: 2021-09-29 | Stop reason: SURG

## 2021-09-29 RX ORDER — PHENYLEPHRINE HCL IN 0.9% NACL 0.8MG/10ML
SYRINGE (ML) INTRAVENOUS AS NEEDED
Status: DISCONTINUED | OUTPATIENT
Start: 2021-09-29 | End: 2021-09-29 | Stop reason: SURG

## 2021-09-29 RX ORDER — AMOXICILLIN 250 MG
1 CAPSULE ORAL 2 TIMES DAILY
Status: DISCONTINUED | OUTPATIENT
Start: 2021-09-29 | End: 2021-10-01 | Stop reason: HOSPADM

## 2021-09-29 RX ORDER — BISACODYL 5 MG/1
10 TABLET, DELAYED RELEASE ORAL DAILY PRN
Status: DISCONTINUED | OUTPATIENT
Start: 2021-09-29 | End: 2021-10-01 | Stop reason: HOSPADM

## 2021-09-29 RX ORDER — ONDANSETRON 2 MG/ML
4 INJECTION INTRAMUSCULAR; INTRAVENOUS EVERY 6 HOURS PRN
Status: DISCONTINUED | OUTPATIENT
Start: 2021-09-29 | End: 2021-09-29

## 2021-09-29 RX ORDER — BUPIVACAINE HYDROCHLORIDE 7.5 MG/ML
INJECTION, SOLUTION EPIDURAL; RETROBULBAR
Status: COMPLETED | OUTPATIENT
Start: 2021-09-29 | End: 2021-09-29

## 2021-09-29 RX ORDER — OXYTOCIN/0.9 % SODIUM CHLORIDE 30/500 ML
125 PLASTIC BAG, INJECTION (ML) INTRAVENOUS CONTINUOUS PRN
Status: DISCONTINUED | OUTPATIENT
Start: 2021-09-29 | End: 2021-10-01 | Stop reason: HOSPADM

## 2021-09-29 RX ORDER — PROMETHAZINE HYDROCHLORIDE 12.5 MG/1
12.5 SUPPOSITORY RECTAL EVERY 6 HOURS PRN
Status: DISCONTINUED | OUTPATIENT
Start: 2021-09-29 | End: 2021-10-01 | Stop reason: HOSPADM

## 2021-09-29 RX ORDER — ONDANSETRON 4 MG/1
4 TABLET, FILM COATED ORAL EVERY 6 HOURS PRN
Status: DISCONTINUED | OUTPATIENT
Start: 2021-09-29 | End: 2021-10-01 | Stop reason: HOSPADM

## 2021-09-29 RX ORDER — SODIUM CHLORIDE, SODIUM LACTATE, POTASSIUM CHLORIDE, CALCIUM CHLORIDE 600; 310; 30; 20 MG/100ML; MG/100ML; MG/100ML; MG/100ML
125 INJECTION, SOLUTION INTRAVENOUS CONTINUOUS
Status: DISCONTINUED | OUTPATIENT
Start: 2021-09-29 | End: 2021-09-29

## 2021-09-29 RX ORDER — PRENATAL VIT/IRON FUM/FOLIC AC 27MG-0.8MG
1 TABLET ORAL DAILY
Status: DISCONTINUED | OUTPATIENT
Start: 2021-09-29 | End: 2021-10-01 | Stop reason: HOSPADM

## 2021-09-29 RX ORDER — DEXAMETHASONE SODIUM PHOSPHATE 4 MG/ML
INJECTION, SOLUTION INTRA-ARTICULAR; INTRALESIONAL; INTRAMUSCULAR; INTRAVENOUS; SOFT TISSUE AS NEEDED
Status: DISCONTINUED | OUTPATIENT
Start: 2021-09-29 | End: 2021-09-29 | Stop reason: SURG

## 2021-09-29 RX ORDER — OXYCODONE AND ACETAMINOPHEN 10; 325 MG/1; MG/1
1 TABLET ORAL EVERY 6 HOURS PRN
Status: DISCONTINUED | OUTPATIENT
Start: 2021-09-29 | End: 2021-10-01 | Stop reason: HOSPADM

## 2021-09-29 RX ORDER — OXYCODONE HYDROCHLORIDE AND ACETAMINOPHEN 5; 325 MG/1; MG/1
1 TABLET ORAL EVERY 4 HOURS PRN
Status: DISCONTINUED | OUTPATIENT
Start: 2021-09-29 | End: 2021-10-01 | Stop reason: HOSPADM

## 2021-09-29 RX ORDER — OXYTOCIN/0.9 % SODIUM CHLORIDE 30/500 ML
999 PLASTIC BAG, INJECTION (ML) INTRAVENOUS ONCE
Status: DISCONTINUED | OUTPATIENT
Start: 2021-09-29 | End: 2021-10-01 | Stop reason: HOSPADM

## 2021-09-29 RX ORDER — LANOLIN ALCOHOL/MO/W.PET/CERES
400 CREAM (GRAM) TOPICAL DAILY
COMMUNITY

## 2021-09-29 RX ORDER — SODIUM CHLORIDE 0.9 % (FLUSH) 0.9 %
3 SYRINGE (ML) INJECTION EVERY 12 HOURS SCHEDULED
Status: DISCONTINUED | OUTPATIENT
Start: 2021-09-29 | End: 2021-10-01 | Stop reason: HOSPADM

## 2021-09-29 RX ORDER — IBUPROFEN 800 MG/1
800 TABLET ORAL EVERY 8 HOURS PRN
Status: DISCONTINUED | OUTPATIENT
Start: 2021-09-29 | End: 2021-10-01 | Stop reason: HOSPADM

## 2021-09-29 RX ADMIN — OXYTOCIN 10 UNITS: 10 INJECTION, SOLUTION INTRAMUSCULAR; INTRAVENOUS at 08:42

## 2021-09-29 RX ADMIN — SODIUM CHLORIDE, POTASSIUM CHLORIDE, SODIUM LACTATE AND CALCIUM CHLORIDE 125 ML/HR: 600; 310; 30; 20 INJECTION, SOLUTION INTRAVENOUS at 04:44

## 2021-09-29 RX ADMIN — NALBUPHINE HYDROCHLORIDE 2.5 MG: 10 INJECTION, SOLUTION INTRAMUSCULAR; INTRAVENOUS; SUBCUTANEOUS at 10:34

## 2021-09-29 RX ADMIN — SODIUM CHLORIDE, POTASSIUM CHLORIDE, SODIUM LACTATE AND CALCIUM CHLORIDE 125 ML/HR: 600; 310; 30; 20 INJECTION, SOLUTION INTRAVENOUS at 14:50

## 2021-09-29 RX ADMIN — METRONIDAZOLE 500 MG: 500 INJECTION, SOLUTION INTRAVENOUS at 17:13

## 2021-09-29 RX ADMIN — DOCUSATE SODIUM 50 MG AND SENNOSIDES 8.6 MG 1 TABLET: 8.6; 5 TABLET, FILM COATED ORAL at 21:04

## 2021-09-29 RX ADMIN — PRENATAL VIT W/ FE FUMARATE-FA TAB 27-0.8 MG 1 TABLET: 27-0.8 TAB at 17:13

## 2021-09-29 RX ADMIN — Medication 160 MCG: at 08:13

## 2021-09-29 RX ADMIN — HYDROMORPHONE HYDROCHLORIDE 100 MCG: 1 INJECTION, SOLUTION INTRAMUSCULAR; INTRAVENOUS; SUBCUTANEOUS at 08:03

## 2021-09-29 RX ADMIN — BUPIVACAINE HYDROCHLORIDE 1.7 ML: 7.5 INJECTION, SOLUTION EPIDURAL; RETROBULBAR at 08:03

## 2021-09-29 RX ADMIN — METRONIDAZOLE 500 MG: 500 INJECTION, SOLUTION INTRAVENOUS at 11:21

## 2021-09-29 RX ADMIN — CEFAZOLIN 2 G: 10 INJECTION, POWDER, FOR SOLUTION INTRAVENOUS at 08:07

## 2021-09-29 RX ADMIN — HYDROMORPHONE HYDROCHLORIDE 900 MCG: 1 INJECTION, SOLUTION INTRAMUSCULAR; INTRAVENOUS; SUBCUTANEOUS at 08:20

## 2021-09-29 RX ADMIN — OXYTOCIN-SODIUM CHLORIDE 0.9% IV SOLN 30 UNIT/500ML 125 ML/HR: 30-0.9/5 SOLUTION at 10:11

## 2021-09-29 RX ADMIN — SODIUM CITRATE AND CITRIC ACID MONOHYDRATE 30 ML: 500; 334 SOLUTION ORAL at 07:40

## 2021-09-29 RX ADMIN — OXYCODONE HYDROCHLORIDE AND ACETAMINOPHEN 1 TABLET: 5; 325 TABLET ORAL at 18:24

## 2021-09-29 RX ADMIN — CEFAZOLIN 2 G: 10 INJECTION, POWDER, FOR SOLUTION INTRAVENOUS at 07:41

## 2021-09-29 RX ADMIN — FENTANYL CITRATE 50 MCG: 50 INJECTION, SOLUTION INTRAMUSCULAR; INTRAVENOUS at 09:19

## 2021-09-29 RX ADMIN — IBUPROFEN 800 MG: 800 TABLET, FILM COATED ORAL at 21:04

## 2021-09-29 RX ADMIN — Medication 80 MCG: at 08:07

## 2021-09-29 RX ADMIN — CEFEPIME HYDROCHLORIDE 2 G: 2 INJECTION, POWDER, FOR SOLUTION INTRAVENOUS at 12:23

## 2021-09-29 RX ADMIN — ONDANSETRON 4 MG: 2 INJECTION INTRAMUSCULAR; INTRAVENOUS at 07:59

## 2021-09-29 RX ADMIN — OXYTOCIN 20 UNITS: 10 INJECTION, SOLUTION INTRAMUSCULAR; INTRAVENOUS at 08:43

## 2021-09-29 RX ADMIN — CEFEPIME HYDROCHLORIDE 2 G: 2 INJECTION, POWDER, FOR SOLUTION INTRAVENOUS at 20:16

## 2021-09-29 RX ADMIN — OXYCODONE AND ACETAMINOPHEN 1 TABLET: 325; 10 TABLET ORAL at 22:31

## 2021-09-29 RX ADMIN — DEXAMETHASONE SODIUM PHOSPHATE 8 MG: 4 INJECTION, SOLUTION INTRAMUSCULAR; INTRAVENOUS at 08:50

## 2021-09-29 NOTE — ANESTHESIA PROCEDURE NOTES
Spinal Block      Preanesthetic Checklist  Completed: patient identified, IV checked, site marked, risks and benefits discussed, surgical consent, monitors and equipment checked, pre-op evaluation and timeout performed  Spinal Block Prep:  Patient Position:sitting  Sterile Tech:cap, gloves, sterile barriers and mask  Prep:Chloraprep  Patient Monitoring:EKG, continuous pulse oximetry and blood pressure monitoring  Spinal Block Procedure  Approach:midline  Guidance:landmark technique  Location:L4-L5  Needle Gauge:25 G  Placement of Spinal needle event:cerebrospinal fluid aspirated  Paresthesia: no  Fluid Appearance:clear  Medications: bupivacaine PF (MARCAINE) 0.75%, 1.7 mL  Med Administered at 9/29/2021 8:03 AM   Post Assessment  Patient Tolerance:patient tolerated the procedure well with no apparent complications  Complications no

## 2021-09-29 NOTE — ANESTHESIA PREPROCEDURE EVALUATION
Anesthesia Evaluation     no history of anesthetic complications:  NPO Solid Status: > 8 hours  NPO Liquid Status: > 8 hours           Airway   Mallampati: II  TM distance: >3 FB  Neck ROM: full  No difficulty expected  Dental      Pulmonary - negative pulmonary ROS   Cardiovascular - negative cardio ROS        Neuro/Psych- negative ROS  GI/Hepatic/Renal/Endo    (+) obesity,       Musculoskeletal     Abdominal    Substance History - negative use     OB/GYN    (+) Pregnant,         Other - negative ROS                Lab Results   Component Value Date    WBC 7.58 09/29/2021    HGB 12.1 09/29/2021    HCT 34.8 09/29/2021    MCV 97.5 (H) 09/29/2021     09/29/2021                Anesthesia Plan    ASA 2     spinal       Anesthetic plan, all risks, benefits, and alternatives have been provided, discussed and informed consent has been obtained with: patient.    Plan discussed with CRNA.

## 2021-09-30 LAB
BASOPHILS # BLD AUTO: 0.03 10*3/MM3 (ref 0–0.2)
BASOPHILS NFR BLD AUTO: 0.3 % (ref 0–1.5)
DEPRECATED RDW RBC AUTO: 47.8 FL (ref 37–54)
EOSINOPHIL # BLD AUTO: 0.06 10*3/MM3 (ref 0–0.4)
EOSINOPHIL NFR BLD AUTO: 0.6 % (ref 0.3–6.2)
ERYTHROCYTE [DISTWIDTH] IN BLOOD BY AUTOMATED COUNT: 12.6 % (ref 12.3–15.4)
HCT VFR BLD AUTO: 32.7 % (ref 34–46.6)
HGB BLD-MCNC: 11 G/DL (ref 12–15.9)
IMM GRANULOCYTES # BLD AUTO: 0.03 10*3/MM3 (ref 0–0.05)
IMM GRANULOCYTES NFR BLD AUTO: 0.3 % (ref 0–0.5)
LYMPHOCYTES # BLD AUTO: 1.82 10*3/MM3 (ref 0.7–3.1)
LYMPHOCYTES NFR BLD AUTO: 19.5 % (ref 19.6–45.3)
MCH RBC QN AUTO: 34.8 PG (ref 26.6–33)
MCHC RBC AUTO-ENTMCNC: 33.6 G/DL (ref 31.5–35.7)
MCV RBC AUTO: 103.5 FL (ref 79–97)
MONOCYTES # BLD AUTO: 0.52 10*3/MM3 (ref 0.1–0.9)
MONOCYTES NFR BLD AUTO: 5.6 % (ref 5–12)
NEUTROPHILS NFR BLD AUTO: 6.86 10*3/MM3 (ref 1.7–7)
NEUTROPHILS NFR BLD AUTO: 73.7 % (ref 42.7–76)
NRBC BLD AUTO-RTO: 0 /100 WBC (ref 0–0.2)
PLATELET # BLD AUTO: 216 10*3/MM3 (ref 140–450)
PMV BLD AUTO: 11 FL (ref 6–12)
RBC # BLD AUTO: 3.16 10*6/MM3 (ref 3.77–5.28)
WBC # BLD AUTO: 9.32 10*3/MM3 (ref 3.4–10.8)

## 2021-09-30 PROCEDURE — 85025 COMPLETE CBC W/AUTO DIFF WBC: CPT | Performed by: OBSTETRICS & GYNECOLOGY

## 2021-09-30 PROCEDURE — 25010000002 CEFEPIME PER 500 MG: Performed by: OBSTETRICS & GYNECOLOGY

## 2021-09-30 RX ADMIN — METRONIDAZOLE 500 MG: 500 INJECTION, SOLUTION INTRAVENOUS at 00:06

## 2021-09-30 RX ADMIN — METRONIDAZOLE 500 MG: 500 INJECTION, SOLUTION INTRAVENOUS at 05:44

## 2021-09-30 RX ADMIN — DOCUSATE SODIUM 50 MG AND SENNOSIDES 8.6 MG 1 TABLET: 8.6; 5 TABLET, FILM COATED ORAL at 20:00

## 2021-09-30 RX ADMIN — CEFEPIME HYDROCHLORIDE 2 G: 2 INJECTION, POWDER, FOR SOLUTION INTRAVENOUS at 03:31

## 2021-09-30 RX ADMIN — OXYCODONE AND ACETAMINOPHEN 1 TABLET: 325; 10 TABLET ORAL at 19:59

## 2021-09-30 RX ADMIN — OXYCODONE AND ACETAMINOPHEN 1 TABLET: 325; 10 TABLET ORAL at 11:55

## 2021-09-30 RX ADMIN — DOCUSATE SODIUM 50 MG AND SENNOSIDES 8.6 MG 1 TABLET: 8.6; 5 TABLET, FILM COATED ORAL at 08:42

## 2021-09-30 RX ADMIN — OXYCODONE AND ACETAMINOPHEN 1 TABLET: 325; 10 TABLET ORAL at 06:17

## 2021-09-30 RX ADMIN — PRENATAL VIT W/ FE FUMARATE-FA TAB 27-0.8 MG 1 TABLET: 27-0.8 TAB at 08:42

## 2021-09-30 RX ADMIN — CEFEPIME HYDROCHLORIDE 2 G: 2 INJECTION, POWDER, FOR SOLUTION INTRAVENOUS at 11:55

## 2021-09-30 NOTE — ANESTHESIA POSTPROCEDURE EVALUATION
Patient: Ruby Chan    Procedure Summary     Date: 21 Room / Location: [facility] LABOR DELIVERY 1 /  PAD LABOR DELIVERY    Anesthesia Start: 757 Anesthesia Stop:     Procedure: REPEAT  SECTION WITH TUBAL LIGATION (N/A Abdomen) Diagnosis: (PREVIOUS C SECTION)    Surgeons: Pauly Wright MD Provider: Lori Godoy CRNA    Anesthesia Type: spinal ASA Status: 2          Anesthesia Type: spinal    Vitals  Vitals Value Taken Time   /77 21 0847   Temp 97.7 °F (36.5 °C) 21 0847   Pulse 99 21 0847   Resp 18 21 0847   SpO2 96 % 21 0847           Post Anesthesia Care and Evaluation    Patient location during evaluation: floor  Level of consciousness: awake  Pain score: 0  Pain management: adequate  Anesthetic complications: No anesthetic complications  PONV Status: none  Cardiovascular status: acceptable  Respiratory status: acceptable  Hydration status: acceptable  Post Neuraxial Block status: Motor and sensory function returned to baseline and No signs or symptoms of PDPH

## 2021-10-01 VITALS
OXYGEN SATURATION: 97 % | RESPIRATION RATE: 16 BRPM | HEIGHT: 69 IN | TEMPERATURE: 98 F | BODY MASS INDEX: 38.12 KG/M2 | DIASTOLIC BLOOD PRESSURE: 74 MMHG | SYSTOLIC BLOOD PRESSURE: 123 MMHG | WEIGHT: 257.4 LBS | HEART RATE: 67 BPM

## 2021-10-01 RX ORDER — OXYCODONE AND ACETAMINOPHEN 10; 325 MG/1; MG/1
1 TABLET ORAL EVERY 6 HOURS PRN
Qty: 15 TABLET | Refills: 0 | Status: SHIPPED | OUTPATIENT
Start: 2021-10-01 | End: 2021-10-06

## 2021-10-01 RX ADMIN — OXYCODONE AND ACETAMINOPHEN 1 TABLET: 325; 10 TABLET ORAL at 02:09

## 2021-10-01 RX ADMIN — DOCUSATE SODIUM 50 MG AND SENNOSIDES 8.6 MG 1 TABLET: 8.6; 5 TABLET, FILM COATED ORAL at 08:00

## 2021-10-01 RX ADMIN — IBUPROFEN 800 MG: 800 TABLET, FILM COATED ORAL at 07:55

## 2021-10-01 RX ADMIN — PRENATAL VIT W/ FE FUMARATE-FA TAB 27-0.8 MG 1 TABLET: 27-0.8 TAB at 08:00

## 2021-10-01 NOTE — PLAN OF CARE
Goal Outcome Evaluation:  Plan of Care Reviewed With: patient, significant other        Progress: improving  Outcome Summary: VSS. FFMLUU, scant lochia. Prevena dressing intact, has not used abdominal binder this shift. Ambulating often and voiding w/o difficulty. Showered and has rested in between care. Taking Percocet 10 for pain, does complain of bilateral hip soreness. Aqua K pad given and Motrin offered but patient declined. Bonding well w/ infant.

## 2021-10-01 NOTE — PAYOR COMM NOTE
"Shelbie Chan (30 y.o. Female) [id number]  New inpt request   Admit 9/29  D/C 10/1   C/section  Baby d/c today 10/1   [facility]   ortiz phone    Fax        Date of Birth Social Security Number Address Home Phone MRN    1990  [facility] [place] [address] [phone] [id number]    Adventism Marital Status          Worship        Admission Date Admission Type Admitting Provider Attending Provider Department, Room/Bed    9/29/21 Elective Pauly Wright MD  [facility] MOTHER BABY 2A, M235/1    Discharge Date Discharge Disposition Discharge Destination        10/1/2021 Home or Self Care              Attending Provider: (none)   Allergies: No Known Allergies    Isolation: None   Infection: None   Code Status: CPR    Ht: 175.3 cm (69\")   Wt: 117 kg (257 lb 6.4 oz)    Admission Cmt: None   Principal Problem: None                Active Insurance as of 9/29/2021     Primary Coverage     Payor Plan Insurance Group Employer/Plan Group    [employer] [place] [employer] [place]      Payor Plan Address Payor Plan Phone Number Payor Plan Fax Number Effective Dates    [address] [address]   11/19/2019 - None Entered    [place] [address]       Subscriber Name Subscriber Birth Date Member ID       SHELBIE CHAN 1990 [id number]                 Emergency Contacts      (Rel.) Home Phone Work Phone Mobile Phone    RANJANA COTE (Significant Other) -- -- [phone]               History & Physical      H&P signed by [employer] On[employer], [place] at 09/30/21 1225      Scan on 9/29/2021 by [employer], [place]: H&amp;P, [facility], 09/07/2021          Electronically signed by [employer] On[employer], [place] at 09/30/21 1225     H&P signed by [employer] On[employer], [facility] at 09/30/21 1225      Scan on 9/29/2021 by [employer] On[employer], [place]: H&amp;P, [facility], 09/07/2021          Electronically signed by [employer] On[employer], [employer] at 09/30/21 1225   "        Discharge Summaryl      Davonte Kaur MD at 10/01/21 0637              Davonte COLORADO. MD Natasha  [facility]  [address]  [place] [address]  Office [phone]  Fax [phone]      [facility]   PROGRESS NOTE    Post-Op Day 2 S/P   Subjective     Patient reports:  Pain is well controlled with prescription NSAID's including ibuprofen (Motrin) and narcotic analgesics including Percocet.  She is ambulating. Tolerating diet. Voiding - without difficulty; flatus reported..  Vaginal bleeding is as much as expected.      Objective      Vitals: Vital Signs Range for the last 24 hours  Temperature: Temp:  [97.7 °F (36.5 °C)-98.5 °F (36.9 °C)] 98 °F (36.7 °C)   Temp Source: Temp src: Temporal   BP: BP: (106-125)/(63-77) 106/65   Pulse: Heart Rate:  [80-99] 80   Respirations: Resp:  [16-18] 16   SPO2: SpO2:  [95 %-98 %] 95 %   O2 Amount (l/min):     O2 Devices Device (Oxygen Therapy): room air   Weight:              Physical Exam    Lungs clear to auscultation bilaterally   Abdomen Soft, non-tender, normal bowel sounds; no bruits, organomegaly or masses.   Incision  healing well   Extremities extremities normal, atraumatic, no cyanosis or edema     I reviewed the patient's new clinical results.  Lab Results (last 24 hours)     ** No results found for the last 24 hours. **          External Prenatal Results     Pregnancy Outside Results - Transcribed From Office Records - See Scanned Records For Details     Test Value Date Time    ABO  A  21 0444    Rh  Positive  21 0444    Antibody Screen  Negative  21 0444      ^ Negative  21     Varicella IgG       Rubella ^ Immune  21     Hgb  11.0 g/dL 21 0544       12.1 g/dL 21 0444    Hct  32.7 % 21 0544       34.8 % 21 0444    Glucose Fasting GTT       Glucose Tolerance Test 1 hour       Glucose Tolerance Test 3 hour       Gonorrhea (discrete)       Chlamydia (discrete)       RPR ^  Non-Reactive  01/21/21     VDRL       Syphilis Antibody       HBsAg ^ Negative  01/21/21     Herpes Simplex Virus PCR       Herpes Simplex VIrus Culture ^ HSV 1 POS  01/21/21     HIV ^ Non-Reactive  01/21/21     Hep C RNA Quant PCR       Hep C Antibody ^ Negative  01/21/21     AFP ^ Normal  04/15/21     Group B Strep ^ POS  05/12/20     GBS Susceptibility to Clindamycin       GBS Susceptibility to Erythromycin       Fetal Fibronectin       Genetic Testing, Maternal Blood             Drug Screening     Test Value Date Time    Urine Drug Screen       Amphetamine Screen       Barbiturate Screen       Benzodiazepine Screen       Methadone Screen       Phencyclidine Screen       Opiates Screen       THC Screen       Cocaine Screen       Propoxyphene Screen       Buprenorphine Screen       Methamphetamine Screen       Oxycodone Screen       Tricyclic Antidepressants Screen             Legend    ^: Historical                        Assessment/Plan        * No active hospital problems. *      Assessment:    Rbuy Chan is Day 2  post-partum  OTHER   .       Plan:  continue post op care and plan for discharge today.        Davonte Kaur MD  10/1/2021  06:37 CDT        Electronically signed by Davonte Kaur MD at 10/01/21 0637     Janis Oliver, RN at 10/01/21 7170        Goal Outcome Evaluation:  Plan of Care Reviewed With: patient, significant other        Progress: improving  Outcome Summary: VSS. FFMLUU, scant lochia. Prevena dressing intact, has not used abdominal binder this shift. Ambulating often and voiding w/o difficulty. Showered and has rested in between care. Taking Percocet 10 for pain, does complain of bilateral hip soreness. Aqua K pad given and Motrin offered but patient declined. Bonding well w/ infant.    Electronically signed by Janis Oliver RN at 10/01/21 7306     Naye Bowie RN at 09/30/21 1078        Goal Outcome Evaluation:  Plan of Care Reviewed With: father, mother       "  Progress: improving  Outcome Summary: VSS, FF, ML, UU, scant lochia, prevena dressing intact, voiding, ambulates often, IV abx complete.    Electronically signed by Naye Bowie RN at 21 1990     Pauly Wright MD at 21 0719          [facility]   Section Postpartum Delivery Progress Note    Subjective   Postpartum Day 1:  Delivery    The patient feels well.  Her pain is well controlled with prescribed pain medications.   She is ambulating well.  Patient describes her bleeding as moderate lochia.    Breastfeeding: declines.    Objective     Vital Signs Range for the last 24 hours  Temperature: Temp:  [97.6 °F (36.4 °C)-98.2 °F (36.8 °C)] 98.2 °F (36.8 °C)   Temp Source: Temp src: Oral   BP: BP: (102-124)/(50-74) 105/50   Pulse: Heart Rate:  [54-86] 75   Respirations: Resp:  [16-18] 16   SPO2: SpO2:  [93 %-97 %] 95 %   O2 Amount(L/min):     O2 Devices Device (Oxygen Therapy): room air   Weight:       Admit Height:  Height: 175.3 cm (69\")      Physical Exam:  General:  no acute distresss.  Abdomen: Fundus: appropriate, firm, non tender  Extremities: normal, atraumatic, no cyanosis, and trace edema.   Incision: prevena in place    Lab results reviewed:  Yes   Rubella:  No results found for: RUBELLAIGGIN Nurse Transcribed from prenatal record --  No components found for: EXTRUBELQUAL  Rh Status:    RH type   Date Value Ref Range Status   2021 Positive  Final     Immunizations: There is no immunization history for the selected administration types on file for this patient.    Assessment/Plan       * No active hospital problems. *      Ruby Chan is Day 1  post-partum  OTHER   .      Plan:  Continue current care.      Pauly Wright MD  2021  07:19 CDT    Electronically signed by Pauly Wright MD at 21 0720     Magdalena Ngo, RN at 21 0518        Goal Outcome Evaluation:              Outcome Summary: VSS; FFML uu-u1 with scant " lochia. No clots noted. Ambulating and voiding without difficulty. ALT inc with prevena CDI. Bonding well with infant. IV antibiotics continue as ordered.    Electronically signed by Magdalena Ngo RN at 21 0518     Naye Bowie, RN at 21 1645        Goal Outcome Evaluation:  Plan of Care Reviewed With: patient        Progress: improving  Outcome Summary: VSS, FF, ML, UU, scant lochia, dickens cath to BSD with good output, ALT incision with prevena intact, up to side of bed, wheelchair off floor.    Electronically signed by Naye Bowie RN at 21 1645     Stephanie Gunter, ABBIE at 21 1025        DELIVERY TYPE COMMENT - J extension on left side per Dr. Wright. See op note.    Electronically signed by Stephanie Gunter RN at 21 1026     Marzena Amato at 21 0934        Appointment with  on  at 10:30 am     Electronically signed by Marzena Amato at 21 1040     Stephanie Gunter RN at 21 0914        Placenta, left fallopian tube, and right fallopian tube handed off to SHANE Gomez. Cord segment handed off to NADIYA Mckenzie RN.    Electronically signed by Stephanie Gunter RN at 21 0938     Pauly Wright MD at 21 0630           Section Procedure Note    Indications: Previous , Multiparity, Sterilization    Pre-operative Diagnosis: Previous , Multiparity, Sterilization    Post-operative Diagnosis: Previous , Multiparity, Sterilization    Procedure: Repeat , Bilateral Tubal Ligation    Surgeon: Pauly Wright MD     Assistants: None    Anesthesia: Spinal anesthesia    ASA Class: 2      Details of Procedure:   The risks, benefits, indications and alternatives of the procedure were reviewed with the patient and informed consent was obtained. The patient was taken to the Operating Room with an IV running. She was placed in the dorsal supine position, with  "a leftward tilt, then prepped and draped in the usual sterile fashion. Her spinal was noted to not be providing adequate anesthesia after an Allis test was performed,therefore she was put under general. A Pfannenstiel skin incision was made approximately 2cm above the symphysis pubis and extended down sharply to the rectus fascia through a densely fibrotic area that was tented down in the middle of the incision. The subcutaneous tissue was freed from the fibrotic tissue. The fascia was then nicked in the midline and extended laterally. The muscles of the anterior abdominal wall were . The fascia was then grasped and the fascial incision extended laterally via superior and inferior traction. The peritoneum was next identified, and entered bluntly with the digits. The peritoneal incision was also extended via traction, taking care to note the position of the bladder. Next a bladder blade was inserted. A vesicouterine incision was made with the metzembaum scissors, and a bladder flap gently created. The bladder blade was then reinserted. Next, the uterine incision was made with the scalpel, in transverse, elliptical fashion. The uterine incision was extended laterally via traction. The bladder blade was then removed, and the fetal vertex was unable to be delivered through the uterine incision. A mityvac vacuum was placed on the vertex twice without delivery of the vertex. Decision was then made to extend the left side of the uterine incision and the fetal vertex was then delivered without difficulty. Nares and mouth were suctioned on the sterile field. The remainder of the infant was then delivered, and infant passed to the awaiting NICU team. Next, the placenta was delivered manually, taking care to remove all membranes. The uterus was then exteriorized, and cleared of all clot and debris. The \"J\" extension with closed with multiple layers of mattress sutures and the remainder of the uterine incision was " closed with 0-Vicryl in running, locking fashion. Excellent hemostasis was noted. Next, the tubes on each side where grasped with yarely clamps. A 0-Plain gut suture ligature was transfixed in the mesosalpinx and a suture ligature placed across a knuckle of each tube. A second free tie of plain gut was used and a second suture ligature placed around each tube. The metzembaum scissors were then used to create a window through the mesosalpinx, and the knuckle of tube excised and passed off, after verifying tubal remnants were present in each specimen. The ends of the remaining tubal remnants were then cauterized with excellent hemostasis noted. Copious irrigation of the posterior cul-de-sac was performed with warm, sterile saline. The uterus was returned to the abdominal cavity, and the gutters cleared of all clot and debris and complete abdominal sweep revealed no retained sponges or instruments. The fascia was then closed with loop PDS in running, locking fashion. The subcutaneous tissue was reapproximated with interrupted 0-vicryl mattress sutures. The skin was also closed with interrupted mattress sutures and staples. The skin was then dressed with Prevena incisional management system.    The patient tolerated the procedure well, and sponge, lap and needle counts were correct x 2.    DISPOSITION: stable to the labor & delivery recovery room.       Findings:  VFI  In cephalic, OA position, clear,no nuchal cord. Manual extraction of  Normal placenta and cord. Apgars         APGARS  One minute Five minutes Ten minutes Fifteen minutes Twenty minutes   Skin color: 0   1             Heart rate: 2   2             Grimace: 2   2              Muscle tone: 2   2              Breathin   2              Totals: 8   9              , Weight: 4000 g (8 lb 13.1 oz)  Length: 21.5  in. NICU/Nursery Present.    Estimated Blood Loss:  600ml           Drains: 200ml           Total IV Fluids: 1800ml           Specimens: Placenta,  Cord blood, Cord gases, Portions of R/L tubes           Complications:  None; patient tolerated the procedure well.           Disposition: PACU - hemodynamically stable.           Condition: stable    Attending Attestation: I performed the procedure.    Pauly Wright MD  2021  09:58 CDT              Electronically signed by Pauly Wright MD at 21 1017     H&P signed by New Onbase, [place] at 21 1225      Scan on 2021 by New Onbase, [place]: H&amp;P, [facility], 2021          Electronically signed by New Onbase, [place] at 21 1225     H&P signed by New Onbase, [place] at 21 1225      Scan on 2021 by New Onbase, [place]: H&amp;P, PAD, 2021          Electronically signed by New Onbase, [place] at 21 1225          Operative/Procedure Notes (last 72 hours) (Notes from 21 1041 through 10/01/21 1041)      Pauly Wright MD at 21 0630           Section Procedure Note    Indications: Previous , Multiparity, Sterilization    Pre-operative Diagnosis: Previous , Multiparity, Sterilization    Post-operative Diagnosis: Previous , Multiparity, Sterilization    Procedure: Repeat , Bilateral Tubal Ligation    Surgeon: Pauly Wright MD     Assistants: None    Anesthesia: Spinal anesthesia    ASA Class: 2      Details of Procedure:   The risks, benefits, indications and alternatives of the procedure were reviewed with the patient and informed consent was obtained. The patient was taken to the Operating Room with an IV running. She was placed in the dorsal supine position, with a leftward tilt, then prepped and draped in the usual sterile fashion. Her spinal was noted to not be providing adequate anesthesia after an Allis test was performed,therefore she was put under general. A Pfannenstiel skin incision was made approximately 2cm above the symphysis pubis and extended down  "sharply to the rectus fascia through a densely fibrotic area that was tented down in the middle of the incision. The subcutaneous tissue was freed from the fibrotic tissue. The fascia was then nicked in the midline and extended laterally. The muscles of the anterior abdominal wall were . The fascia was then grasped and the fascial incision extended laterally via superior and inferior traction. The peritoneum was next identified, and entered bluntly with the digits. The peritoneal incision was also extended via traction, taking care to note the position of the bladder. Next a bladder blade was inserted. A vesicouterine incision was made with the metzembaum scissors, and a bladder flap gently created. The bladder blade was then reinserted. Next, the uterine incision was made with the scalpel, in transverse, elliptical fashion. The uterine incision was extended laterally via traction. The bladder blade was then removed, and the fetal vertex was unable to be delivered through the uterine incision. A mityvac vacuum was placed on the vertex twice without delivery of the vertex. Decision was then made to extend the left side of the uterine incision and the fetal vertex was then delivered without difficulty. Nares and mouth were suctioned on the sterile field. The remainder of the infant was then delivered, and infant passed to the awaiting NICU team. Next, the placenta was delivered manually, taking care to remove all membranes. The uterus was then exteriorized, and cleared of all clot and debris. The \"J\" extension with closed with multiple layers of mattress sutures and the remainder of the uterine incision was closed with 0-Vicryl in running, locking fashion. Excellent hemostasis was noted. Next, the tubes on each side where grasped with yarely clamps. A 0-Plain gut suture ligature was transfixed in the mesosalpinx and a suture ligature placed across a knuckle of each tube. A second free tie of plain gut was " used and a second suture ligature placed around each tube. The metzembaum scissors were then used to create a window through the mesosalpinx, and the knuckle of tube excised and passed off, after verifying tubal remnants were present in each specimen. The ends of the remaining tubal remnants were then cauterized with excellent hemostasis noted. Copious irrigation of the posterior cul-de-sac was performed with warm, sterile saline. The uterus was returned to the abdominal cavity, and the gutters cleared of all clot and debris and complete abdominal sweep revealed no retained sponges or instruments. The fascia was then closed with loop PDS in running, locking fashion. The subcutaneous tissue was reapproximated with interrupted 0-vicryl mattress sutures. The skin was also closed with interrupted mattress sutures and staples. The skin was then dressed with Prevena incisional management system.    The patient tolerated the procedure well, and sponge, lap and needle counts were correct x 2.    DISPOSITION: stable to the labor & delivery recovery room.       Findings:  VFI  In cephalic, OA position, clear,no nuchal cord. Manual extraction of  Normal placenta and cord. Apgars         APGARS  One minute Five minutes Ten minutes Fifteen minutes Twenty minutes   Skin color: 0   1             Heart rate: 2   2             Grimace: 2   2              Muscle tone: 2   2              Breathin   2              Totals: 8   9              , Weight: 4000 g (8 lb 13.1 oz)  Length: 21.5  in. NICU/Nursery Present.    Estimated Blood Loss:  600ml           Drains: 200ml           Total IV Fluids: 1800ml           Specimens: Placenta, Cord blood, Cord gases, Portions of R/L tubes           Complications:  None; patient tolerated the procedure well.           Disposition: PACU - hemodynamically stable.           Condition: stable    Attending Attestation: I performed the procedure.    Pauly Wright MD  2021  09:58  CDT              Electronically signed by Pauly Wright MD at 09/29/21 1013

## 2021-10-01 NOTE — DISCHARGE SUMMARY
Davonte Kaur MD  [facility]  [address]  [place] [address]  Office [phone]  Fax [phone]    Discharge Summary    [facility]  Ruby Chan  : 1990  MRN: [id number]  CSN: [id number]    Date of Admission: 2021   Date of Discharge:  10/1/2021   Delivering Physician: Pauly Wright MD       Admission Diagnosis: 1. Maternal care due to low transverse uterine scar from previous  delivery [O34.211]  2.  delivery delivered [O82]   Discharge Diagnosis: 1. Pregnancy at 39w4d - delivered       Procedures: 1. Repeat      Hospital Course  See the completed operative report for details regarding antepartum course and delivery.    Her post-operative course was unremarkable.  On POD # 2 she felt like she ready for discharge.  She was evaluated by Dr. Kaur who agreed she was able to be discharged to home.  She had no febrile morbidity. She had normal bowel and bladder function and was hemodynamically stable.  Her wound was healing well without obvious signs of infections.    Infant  female  fetus weighing 4000 g (8 lb 13.1 oz)   Apgars -  8 @ 1 minute /  9 @ 5 minutes.    Discharge labs  Lab Results   Component Value Date    WBC 9.32 2021    HGB 11.0 (L) 2021    HCT 32.7 (L) 2021     2021       Discharge Medications     Discharge Medications      New Medications      Instructions Start Date   oxyCODONE-acetaminophen  MG per tablet  Commonly known as: PERCOCET   1 tablet, Oral, Every 6 Hours PRN         Continue These Medications      Instructions Start Date   folic acid 400 MCG tablet  Commonly known as: FOLVITE   400 mcg, Oral, Daily      ondansetron 4 MG tablet  Commonly known as: ZOFRAN   4 mg, Oral, Every 8 Hours PRN      PRENATAL VITAMINS PO   Oral             External Prenatal Results     Pregnancy Outside Results - Transcribed From Office Records - See Scanned Records  For Details     Test Value Date Time    ABO  A  09/29/21 0444    Rh  Positive  09/29/21 0444    Antibody Screen  Negative  09/29/21 0444      ^ Negative  07/01/21     Varicella IgG       Rubella ^ Immune  01/21/21     Hgb  11.0 g/dL 09/30/21 0544       12.1 g/dL 09/29/21 0444    Hct  32.7 % 09/30/21 0544       34.8 % 09/29/21 0444    Glucose Fasting GTT       Glucose Tolerance Test 1 hour       Glucose Tolerance Test 3 hour       Gonorrhea (discrete)       Chlamydia (discrete)       RPR ^ Non-Reactive  01/21/21     VDRL       Syphilis Antibody       HBsAg ^ Negative  01/21/21     Herpes Simplex Virus PCR       Herpes Simplex VIrus Culture ^ HSV 1 POS  01/21/21     HIV ^ Non-Reactive  01/21/21     Hep C RNA Quant PCR       Hep C Antibody ^ Negative  01/21/21     AFP ^ Normal  04/15/21     Group B Strep ^ POS  05/12/20     GBS Susceptibility to Clindamycin       GBS Susceptibility to Erythromycin       Fetal Fibronectin       Genetic Testing, Maternal Blood             Drug Screening     Test Value Date Time    Urine Drug Screen       Amphetamine Screen       Barbiturate Screen       Benzodiazepine Screen       Methadone Screen       Phencyclidine Screen       Opiates Screen       THC Screen       Cocaine Screen       Propoxyphene Screen       Buprenorphine Screen       Methamphetamine Screen       Oxycodone Screen       Tricyclic Antidepressants Screen             Legend    ^: Historical                        Discharge Disposition Home or Self Care   Condition on Discharge: good   Follow-up: 5 days with Kyle for preveno removal     Davonte Kaur MD  10/1/2021

## 2021-10-01 NOTE — PROGRESS NOTES
Davonte Kaur MD  [facility]  [address]  [place] [address]  Office [phone]  Fax [phone]      [facility]   PROGRESS NOTE    Post-Op Day 2 S/P   Subjective     Patient reports:  Pain is well controlled with prescription NSAID's including ibuprofen (Motrin) and narcotic analgesics including Percocet.  She is ambulating. Tolerating diet. Voiding - without difficulty; flatus reported..  Vaginal bleeding is as much as expected.      Objective      Vitals: Vital Signs Range for the last 24 hours  Temperature: Temp:  [97.7 °F (36.5 °C)-98.5 °F (36.9 °C)] 98 °F (36.7 °C)   Temp Source: Temp src: Temporal   BP: BP: (106-125)/(63-77) 106/65   Pulse: Heart Rate:  [80-99] 80   Respirations: Resp:  [16-18] 16   SPO2: SpO2:  [95 %-98 %] 95 %   O2 Amount (l/min):     O2 Devices Device (Oxygen Therapy): room air   Weight:              Physical Exam    Lungs clear to auscultation bilaterally   Abdomen Soft, non-tender, normal bowel sounds; no bruits, organomegaly or masses.   Incision  healing well   Extremities extremities normal, atraumatic, no cyanosis or edema     I reviewed the patient's new clinical results.  Lab Results (last 24 hours)     ** No results found for the last 24 hours. **          External Prenatal Results     Pregnancy Outside Results - Transcribed From Office Records - See Scanned Records For Details     Test Value Date Time    ABO  A  21    Rh  Positive  21 0444    Antibody Screen  Negative  21 0444      ^ Negative  21     Varicella IgG       Rubella ^ Immune  21     Hgb  11.0 g/dL 21 0544       12.1 g/dL 21 0444    Hct  32.7 % 21 0544       34.8 % 21 0444    Glucose Fasting GTT       Glucose Tolerance Test 1 hour       Glucose Tolerance Test 3 hour       Gonorrhea (discrete)       Chlamydia (discrete)       RPR ^ Non-Reactive  21     VDRL       Syphilis Antibody       HBsAg ^ Negative   01/21/21     Herpes Simplex Virus PCR       Herpes Simplex VIrus Culture ^ HSV 1 POS  01/21/21     HIV ^ Non-Reactive  01/21/21     Hep C RNA Quant PCR       Hep C Antibody ^ Negative  01/21/21     AFP ^ Normal  04/15/21     Group B Strep ^ POS  05/12/20     GBS Susceptibility to Clindamycin       GBS Susceptibility to Erythromycin       Fetal Fibronectin       Genetic Testing, Maternal Blood             Drug Screening     Test Value Date Time    Urine Drug Screen       Amphetamine Screen       Barbiturate Screen       Benzodiazepine Screen       Methadone Screen       Phencyclidine Screen       Opiates Screen       THC Screen       Cocaine Screen       Propoxyphene Screen       Buprenorphine Screen       Methamphetamine Screen       Oxycodone Screen       Tricyclic Antidepressants Screen             Legend    ^: Historical                        Assessment/Plan        * No active hospital problems. *      Assessment:    Ruby Chan is Day 2  post-partum  OTHER   .       Plan:  continue post op care and plan for discharge today.        Davonte Kaur MD  10/1/2021  06:37 CDT

## 2024-11-01 NOTE — PROGRESS NOTES
[address] [facility]:     [address] f: [phone] f: [phone] [phone] [email or web address]      Ordering Center:     [address],[facility] [address]  [address] [id number] [phone]  WOUND CARE Dept: [address] NUMBER     Patient Information:      Ephraim Isabel  [address] [facility] [address]   [phone]   : 1990  AGE: 34 y.o. GENDER: female   EPISODE DATE: 9/15/2020    Insurance:      PRIMARY INSURANCE:  Plan: [place] [address]  Coverage: Angie [employer]ado  Effective Date: 2016  Group Number: [id number]  Subscriber Number: [id number] - (Medicaid Managed)      Patient Wound Information:      Problem List Items Addressed This Visit     Abdominal wall skin ulcer, with fat layer exposed ([facility] [address]    H/O:     Dehiscence of external surgical wound    Tobacco abuse          WOUNDS REQUIRING DRESSING SUPPLIES:     Wound 20 Abdomen Mid;Lower Wound 1 Non Healing Surgical, Mid Lower abdomen  (Active)   Wound Image   09/15/20 1032   Wound Non-Healing Surgical 09/15/20 1032   Offloading for Diabetic Foot Ulcers No offloading required 09/15/20 1032   Dressing Status Changed;Clean;Dry; Intact 20 1034   Dressing Changed Changed/New 20 1034   Dressing/Treatment Alginate with Ag;ABD 20 1034   Wound Cleansed Rinsed/Irrigated with saline 09/15/20 1032   Wound Length (cm) 0.5 cm 09/15/20 1032   Wound Width (cm) 2 cm 09/15/20 1032   Wound Depth (cm) 0.1 cm 09/15/20 1032   Wound Surface Area (cm^2) 1 cm^2 09/15/20 1032   Change in Wound Size % (l*w) -58.73 09/15/20 1032   Wound Volume (cm^3) 0.1 cm^3 09/15/20 1032   Wound Healing % 95 09/15/20 1032   Post-Procedure Length (cm) 0.5 cm 09/15/20 1045   Post-Procedure Width (cm) 2 cm 09/15/20 1045   Post-Procedure Depth (cm) 0.1 cm 09/15/20 1045   Post-Procedure Surface Area (cm^2) 1 cm^2 09/15/20 1045   Post-Procedure Volume (cm^3) 0.1 cm^3 09/15/20 1045   Distance Tunneling (cm) 0 cm 09/15/20 1032   Tunneling Position ___ O'Clock 0 09/15/20 1032   Undermining Starts ___ O'Clock 0 09/15/20 1032   Undermining Ends___ O'Clock 0 09/15/20 1032   Undermining Maxium Distance (cm) 0 09/15/20 1032   Wound Assessment Red;Pink 09/15/20 1032   Drainage Amount Small 09/15/20 1032   Drainage Description Serosanguinous 09/15/20 1032   Odor None 09/15/20 1032   Margins Attached edges 09/15/20 1032   Exposed structure Muscle 09/15/20 1032   Pratima-wound Assessment Clean;Dry; Intact 09/15/20 1032   Non-staged Wound Description Full thickness 09/15/20 1032   [place]%Wound Bed 60 09/15/20 1032   Red%Wound Bed 30 09/15/20 1032   Yellow%Wound Bed 10 09/15/20 1032   Black%Wound Bed 0 09/15/20 1032   Purple%Wound Bed 0 09/15/20 1032   Other%Wound Bed 0 09/15/20 1032   Number of days: 68          Supplies Requested :     WOUND #: 1   PRIMARY DRESSING:  Other: Aqu[employer]el AG   Cover and Secure with: ABD pad      FREQUENCY OF DRESSING CHANGES:  Daily         ADDITIONAL ITEMS:  [] Gloves Small  [x] Gloves Medium [] Gloves Large [] Gloves XLarge  [] Tape 1\" [x] Tape 2\" [] Tape 3\"  [] Medipore Tape  [x] Saline  [] Skin Prep   [] Adhesive Remover   [] Cotton Tip Applicators   [] Other:    Patient Wound(s) Debrided: [x] Yes if yes please add date 9/1/20   [] No    Debribement Type: Non-excisional/Selective    Patient currently being seen by Home Health: [] Yes   [x] No    Duration for needed supplies:  []15  [x]30  []60  []90 Days    Electronically signed by Eleonora Bustillos RN on 9/15/2020 at 10:55 AM     Provider Information:      PROVIDER'S NAME: Dr Almaz Fleming    NPI: [id number] Photo Preface (Leave Blank If You Do Not Want): Photographs were obtained today Detail Level: Zone

## (undated) DEVICE — YANKAUER,TAPERED BULBOUS TIP,W/O VENT: Brand: MEDLINE

## (undated) DEVICE — ADHS LIQ MASTISOL 2/3ML

## (undated) DEVICE — ROYAL SILK SURGICAL GOWN, XXL: Brand: CONVERTORS

## (undated) DEVICE — SUT VIC 0 CT1 36IN J946H

## (undated) DEVICE — DRAPE,UTILITY,XL,4/PK,STERILE: Brand: MEDLINE

## (undated) DEVICE — SUT VIC 0 MO4 CR8 18IN VCP701D

## (undated) DEVICE — LARYNGOSCOPE BLDE MAC HNDL M SZ 35 ST CURAPLEX CURAVIEW LED

## (undated) DEVICE — PACK,UNIVERSAL,NO GOWNS: Brand: MEDLINE

## (undated) DEVICE — APPL CHLORAPREP W/TINT 26ML ORNG

## (undated) DEVICE — KENDALL SCD EXPRESS SLEEVES, KNEE LENGTH, MEDIUM: Brand: KENDALL SCD

## (undated) DEVICE — SOL NACL 0.9PCT 1000ML

## (undated) DEVICE — GOWN,PREVENTION PLUS,LARGE,STERILE: Brand: MEDLINE

## (undated) DEVICE — PAD C-SECTION: Brand: MEDLINE INDUSTRIES, INC.

## (undated) DEVICE — Device

## (undated) DEVICE — DRSNG BRDR MEPILEX P/OP SIL 4X12IN

## (undated) DEVICE — GLV SURG TRIUMPH ORTHO W/ALOE PF LTX 7.0

## (undated) DEVICE — APPL CHLORAPREP HI/LITE 26ML ORNG

## (undated) DEVICE — MINOR CDS: Brand: MEDLINE INDUSTRIES, INC.

## (undated) DEVICE — 3M™ STERI-STRIP™ REINFORCED ADHESIVE SKIN CLOSURES, R1547, 1/2 IN X 4 IN (12 MM X 100 MM), 6 STRIPS/ENVELOPE: Brand: 3M™ STERI-STRIP™

## (undated) DEVICE — GLV SURG TRIUMPH PF LTX 7 STRL

## (undated) DEVICE — SUT PDS 1 TP1 48IN Z880G BX/12

## (undated) DEVICE — SUT GUT PLN 0 CT3 27IN N864H

## (undated) DEVICE — SLV SCD KN ADJ EXPRSS LG

## (undated) DEVICE — SUT MNCRYL 0/0 CTX 36IN Y398H

## (undated) DEVICE — GLOVE SURG SZ 7 CRM LTX FREE POLYISOPRENE POLYMER BEAD ANTI

## (undated) DEVICE — TUBE ET 7MM NSL ORAL BASIC CUF INTMED MURPHY EYE RADPQ MRK

## (undated) DEVICE — PREVENA PLUS INCISION MANAGEMENT SYSTEM: Brand: PREVENA PLUS™